# Patient Record
Sex: FEMALE | Race: ASIAN | Employment: FULL TIME | URBAN - METROPOLITAN AREA
[De-identification: names, ages, dates, MRNs, and addresses within clinical notes are randomized per-mention and may not be internally consistent; named-entity substitution may affect disease eponyms.]

---

## 2017-01-09 ENCOUNTER — OFFICE VISIT (OUTPATIENT)
Dept: FAMILY MEDICINE CLINIC | Age: 37
End: 2017-01-09

## 2017-01-09 VITALS
HEART RATE: 88 BPM | SYSTOLIC BLOOD PRESSURE: 104 MMHG | WEIGHT: 152 LBS | HEIGHT: 59 IN | DIASTOLIC BLOOD PRESSURE: 78 MMHG | RESPIRATION RATE: 16 BRPM | OXYGEN SATURATION: 99 % | TEMPERATURE: 97.3 F | BODY MASS INDEX: 30.64 KG/M2

## 2017-01-09 DIAGNOSIS — R73.03 PREDIABETES: ICD-10-CM

## 2017-01-09 DIAGNOSIS — E55.9 VITAMIN D DEFICIENCY: ICD-10-CM

## 2017-01-09 DIAGNOSIS — D56.3 THALASSEMIA MINOR: Primary | ICD-10-CM

## 2017-01-09 DIAGNOSIS — E03.9 HYPOTHYROIDISM IN ADULT: Primary | ICD-10-CM

## 2017-01-09 RX ORDER — LANOLIN ALCOHOL/MO/W.PET/CERES
CREAM (GRAM) TOPICAL
COMMUNITY
End: 2017-01-10 | Stop reason: DRUGHIGH

## 2017-01-09 NOTE — PROGRESS NOTES
HISTORY OF PRESENT ILLNESS  Jennifer Beatty is a 31 y.o. female. She was seen for follow up on thyroid, prediabetes and Vitamin D deficiency. She is breast feeding  HPI  Endocrine Review  Patient is seen for followup of hypothyroidism. Since last visit: her Levothyroxine dose was adjusted to 50 mcg as pt was pregnant. She reports medication compliance: all the time and is taking separate from all her other meds. She reports the following concerns/problems/med side effects: none. Lab review: no lab studies available for review at time of visit. Last blood work with Ob/Gyn  Lab Results   Component Value Date/Time    TSH 3.820 09/10/2015 12:00 AM    T4, Free 1.35 09/10/2015 12:00 AM         Endocrine Review  She is seen for gestational diabetes. Since last visit she reports: no chest pain, dyspnea or TIA's, no numbness, tingling or pain in extremities, no unusual visual symptoms, no hypoglycemia, weight has decreased. Home glucose monitoring: is not performed  She reports medication compliance: n/a - patient not on medications (diet controlled). Medication side effects: n/a. Diabetic diet compliance: compliant all of the time. Lab review: no lab studies available for review at time of visit. Eye exam: n/a  She was on Insulin for last two months of pregnancy  Lab Results   Component Value Date/Time    Hemoglobin A1c 6.1 09/10/2015 12:00 AM      Lab Results   Component Value Date/Time    VITAMIN D, 25-HYDROXY 41.0 09/10/2015 12:00 AM           Review of Systems   Constitutional: Negative for chills, fever and malaise/fatigue. HENT: Negative for congestion, ear pain, sore throat and tinnitus. Eyes: Negative for blurred vision, double vision, pain and discharge. Respiratory: Negative for cough, shortness of breath and wheezing. Cardiovascular: Negative for chest pain, palpitations and leg swelling.    Gastrointestinal: Negative for abdominal pain, blood in stool, constipation, diarrhea, nausea and vomiting. Genitourinary: Negative for dysuria, frequency, hematuria and urgency. Musculoskeletal: Negative for back pain, joint pain and myalgias. Skin: Negative for rash. Neurological: Negative for dizziness, tremors, seizures and headaches. Endo/Heme/Allergies: Negative for polydipsia. Does not bruise/bleed easily. Psychiatric/Behavioral: Negative for depression and substance abuse. The patient is not nervous/anxious. Physical Exam   Constitutional: She is oriented to person, place, and time. She appears well-developed and well-nourished. HENT:   Head: Normocephalic and atraumatic. Right Ear: External ear normal.   Mouth/Throat: Oropharynx is clear and moist. No oropharyngeal exudate. Eyes: Conjunctivae and EOM are normal. Pupils are equal, round, and reactive to light. No scleral icterus. Neck: Normal range of motion. Neck supple. No JVD present. No thyromegaly present. Cardiovascular: Normal rate, regular rhythm, normal heart sounds and intact distal pulses. No murmur heard. Pulmonary/Chest: Effort normal and breath sounds normal. She has no wheezes. Abdominal: Soft. Bowel sounds are normal. She exhibits no distension and no mass. Musculoskeletal: Normal range of motion. She exhibits no edema or tenderness. Lymphadenopathy:     She has no cervical adenopathy. Neurological: She is alert and oriented to person, place, and time. She has normal reflexes. No cranial nerve deficit. Skin: Skin is warm and dry. No rash noted. She is not diaphoretic. Psychiatric: She has a normal mood and affect. Nursing note and vitals reviewed. ASSESSMENT and PLAN  Jennifer was seen today for thyroid problem.     Diagnoses and all orders for this visit:    Hypothyroidism in adult  -     [place] HANDLG&/OR CONVEY OF SPEC FOR TR OFFICE TO LAB  -     [place] COLLECTION VENOUS BLOOD,VENIPUNCTURE  -     METABOLIC PANEL, COMPREHENSIVE  -     TSH AND FREE T4    Vitamin D deficiency  -     [place] HANDLG&/OR CONVEY OF SPEC FOR TR OFFICE TO LAB  -     [place] COLLECTION VENOUS BLOOD,VENIPUNCTURE  -     VITAMIN D, 25 HYDROXY    Prediabetes  -     MD HANDLG&/OR CONVEY OF SPEC FOR TR OFFICE TO LAB  -     MD COLLECTION VENOUS BLOOD,VENIPUNCTURE  -     HEMOGLOBIN A1C WITH EAG    Discussed lifestyle issues and health guidance given  Patient was given an after visit summary which includes diagnoses, vital signs, current medications, instructions and references & authorized prescriptions . Results of labs will be conveyed to patient, once available. Pt verbalized instructions I provided and expressed understanding of discussion that was held today.   Follow-up Disposition:  Return in about 6 months (around 7/9/2017) for fasting, physical.

## 2017-01-09 NOTE — MR AVS SNAPSHOT
Visit Information Date & Time Provider Department Dept. Phone Encounter #  
 1/9/2017  1:15 PM Wilian Davila MD [facility] [id number] Follow-up Instructions Return in about 6 months (around 7/9/2017) for fasting, physical.  
  
Upcoming Health Maintenance Date Due  
 PAP AKA CERVICAL CYTOLOGY 3/7/2019 DTaP/Tdap/Td series (2 - Td) 8/8/2026 Allergies as of 1/9/2017  Review Complete On: 1/9/2017 By: Wilian Davila MD  
 No Known Allergies Current Immunizations  Reviewed on 1/9/2017 Name Date Tdap 8/8/2016 Reviewed by Wilian Davila MD on 1/9/2017 at  1:35 PM  
You Were Diagnosed With   
  
 Codes Comments Hypothyroidism in adult    -  Primary ICD-10-CM: E03.9 ICD-9-CM: 480. 9 Vitamin D deficiency     ICD-10-CM: E55.9 ICD-9-CM: 268.9 Prediabetes     ICD-10-CM: R73.03 
ICD-9-CM: 790.29 Vitals BP Pulse Temp Resp Height(growth percentile) 104/78 (BP 1 Location: Left arm, BP Patient Position: Sitting) 88 97.3 °F (36.3 °C) (Axillary) 16 4' 11\" (1.499 m) Weight(growth percentile) SpO2 BMI OB Status Smoking Status 152 lb (68.9 kg) 99% 30.7 kg/m2 Recent pregnancy Never Smoker Vitals History BMI and BSA Data Body Mass Index Body Surface Area 30.7 kg/m 2 1.69 m 2 Preferred Pharmacy Pharmacy Name Phone [facility] [address] [place] [address] [phone] Your Updated Medication List  
  
   
This list is accurate as of: 1/9/17  1:44 PM.  Always use your most recent med list.  
  
  
  
  
 ergocalciferol 50,000 unit capsule Commonly known as:  ERGOCALCIFEROL Take 1 Cap by mouth every seven (7) days. ferrous gluconate 324 mg (36 mg iron) Tab Take 1 Tab by mouth daily. levothyroxine 50 mcg tablet Commonly known as:  SYNTHROID Take 1 Tab by mouth Daily (before breakfast). PRENATAL #2 PO Take  by mouth. We Performed the Following HEMOGLOBIN A1C WITH EAG [08203 CPT(R)] METABOLIC PANEL, COMPREHENSIVE [10774 CPT(R)] [place] COLLECTION VENOUS BLOOD,VENIPUNCTURE [id number] CPT(R)] [place] HANDLG&/OR CONVEY OF SPEC FOR TR OFFICE TO LAB [76311 CPT(R)] TSH AND FREE T4 [75373 CPT(R)] VITAMIN D, 25 HYDROXY [id number] CPT(R)] Follow-up Instructions Return in about 6 months (around 7/9/2017) for fasting, physical.  
  
  
Introducing [facility]! Dear Esau Handley: Thank you for requesting a SMRxT account. Our records indicate that you already have an active SMRxT account. You can access your account anytime at [email or web address]. [email or web address]/[employer] Did you know that you can access your hospital and ER discharge instructions at any time in SMRxT? You can also review all of your test results from your hospital stay or ER visit. Additional Information If you have questions, please visit the Frequently Asked Questions section of the SMRxT website at [email or web address] [email or web address]/. Remember, SMRxT is NOT to be used for urgent needs. For medical emergencies, dial 911. Now available from your iPhone and Android! Please provide this summary of care documentation to your next provider. Your primary care clinician is listed as Lana Portillo. If you have any questions after today's visit, please call [phone].

## 2017-01-09 NOTE — PROGRESS NOTES
Chief Complaint   Patient presents with    Thyroid Problem     Follow up. Gestational diabetes. 1. Have you been to the ER, urgent care clinic since your last visit? Hospitalized since your last visit? Yes, delivery of babyt 10/27/16. 2. Have you seen or consulted any other health care providers outside of the [facility] since your last visit? Yes. OB/GYN. Include any pap smears or colon screening.   Yes

## 2017-01-10 LAB
25(OH)D3+25(OH)D2 SERPL-MCNC: 23.6 NG/ML (ref 30–100)
ALBUMIN SERPL-MCNC: 4.3 G/DL (ref 3.5–5.5)
ALBUMIN/GLOB SERPL: 1.4 {RATIO} (ref 1.1–2.5)
ALP SERPL-CCNC: 54 IU/L (ref 39–117)
ALT SERPL-CCNC: 42 IU/L (ref 0–32)
AST SERPL-CCNC: 35 IU/L (ref 0–40)
BASOPHILS # BLD AUTO: 0 X10E3/UL (ref 0–0.2)
BASOPHILS NFR BLD AUTO: 0 %
BILIRUB SERPL-MCNC: <0.2 MG/DL (ref 0–1.2)
BUN SERPL-MCNC: 13 MG/DL (ref 6–20)
BUN/CREAT SERPL: 16 (ref 8–20)
CALCIUM SERPL-MCNC: 9.4 MG/DL (ref 8.7–10.2)
CHLORIDE SERPL-SCNC: 105 MMOL/L (ref 96–106)
CO2 SERPL-SCNC: 25 MMOL/L (ref 18–29)
CREAT SERPL-MCNC: 0.81 MG/DL (ref 0.57–1)
EOSINOPHIL # BLD AUTO: 0.2 X10E3/UL (ref 0–0.4)
EOSINOPHIL NFR BLD AUTO: 3 %
ERYTHROCYTE [DISTWIDTH] IN BLOOD BY AUTOMATED COUNT: 15.9 % (ref 12.3–15.4)
EST. AVERAGE GLUCOSE BLD GHB EST-MCNC: 114 MG/DL
GLOBULIN SER CALC-MCNC: 3 G/DL (ref 1.5–4.5)
GLUCOSE SERPL-MCNC: 84 MG/DL (ref 65–99)
HBA1C MFR BLD: 5.6 % (ref 4.8–5.6)
HCT VFR BLD AUTO: 31.2 % (ref 34–46.6)
HGB BLD-MCNC: 9.7 G/DL (ref 11.1–15.9)
IMM GRANULOCYTES # BLD: 0 X10E3/UL (ref 0–0.1)
IMM GRANULOCYTES NFR BLD: 0 %
LYMPHOCYTES # BLD AUTO: 2.5 X10E3/UL (ref 0.7–3.1)
LYMPHOCYTES NFR BLD AUTO: 35 %
MCH RBC QN AUTO: 19.7 PG (ref 26.6–33)
MCHC RBC AUTO-ENTMCNC: 31.1 G/DL (ref 31.5–35.7)
MCV RBC AUTO: 63 FL (ref 79–97)
MONOCYTES # BLD AUTO: 0.5 X10E3/UL (ref 0.1–0.9)
MONOCYTES NFR BLD AUTO: 6 %
NEUTROPHILS # BLD AUTO: 3.9 X10E3/UL (ref 1.4–7)
NEUTROPHILS NFR BLD AUTO: 56 %
PLATELET # BLD AUTO: 357 X10E3/UL (ref 150–379)
POTASSIUM SERPL-SCNC: 4.3 MMOL/L (ref 3.5–5.2)
PROT SERPL-MCNC: 7.3 G/DL (ref 6–8.5)
RBC # BLD AUTO: 4.92 X10E6/UL (ref 3.77–5.28)
SODIUM SERPL-SCNC: 143 MMOL/L (ref 134–144)
T4 FREE SERPL-MCNC: 2.02 NG/DL (ref 0.82–1.77)
TSH SERPL DL<=0.005 MIU/L-ACNC: 0.04 UIU/ML (ref 0.45–4.5)
WBC # BLD AUTO: 7 X10E3/UL (ref 3.4–10.8)

## 2017-01-10 RX ORDER — FERROUS SULFATE 325(65) MG
325 TABLET, DELAYED RELEASE (ENTERIC COATED) ORAL
Qty: 180 TAB | Refills: 1 | Status: SHIPPED | OUTPATIENT
Start: 2017-01-10 | End: 2019-02-06 | Stop reason: ALTCHOICE

## 2017-01-10 RX ORDER — LEVOTHYROXINE SODIUM 25 UG/1
25 TABLET ORAL
Qty: 90 TAB | Refills: 1 | Status: SHIPPED | OUTPATIENT
Start: 2017-01-10 | End: 2017-08-23 | Stop reason: SDUPTHER

## 2017-01-10 NOTE — PROGRESS NOTES
As discussed,   Thyroid profile shows, high T4 , low TSH, please decrease dose of Levothyroxine to 25 mcg daily  CBC shows significant anemia, please start taking iron Rx , I have sent to pharmacy  Kidney and liver function normal, Vitamin D borderline low, please add low dose of OTC Vitamin D3 2000 units along with prenatal vitamins

## 2017-01-11 ENCOUNTER — TELEPHONE (OUTPATIENT)
Dept: FAMILY MEDICINE CLINIC | Age: 37
End: 2017-01-11

## 2017-01-11 NOTE — TELEPHONE ENCOUNTER
Patient was left a voice message, informing of prescriptions sent to [facility] on 1/10/17: Levothyroxine and Ferrous Sulfate . Informed if she has more questions can contact via [employer] or office.

## 2017-01-11 NOTE — TELEPHONE ENCOUNTER
Confirmed with Dr. Quiana Crews, patient can take OTC ferrous sulfate 325 mg two tablets daily. Patient informed via [employer].

## 2017-01-11 NOTE — TELEPHONE ENCOUNTER
Patient was informed that per Dr. Gallo Safer she is to take the Levothyroxine 25 mcg daily. Patient stated ferrous sulfate is $79.00 and would like another recommendation, her insurance covers liquid prefers pill form. Patient was informed that the ferrous sulfate 325 mg prescribed is the same as OTC ferrous sulfate 325 mg , can purchase .

## 2017-01-11 NOTE — PROGRESS NOTES
Results discussed with patient by Dr. Carol Rogers via [employer]. Letter sent with results and instructions.

## 2017-01-20 ENCOUNTER — TELEPHONE (OUTPATIENT)
Dept: FAMILY MEDICINE CLINIC | Age: 37
End: 2017-01-20

## 2017-01-20 NOTE — TELEPHONE ENCOUNTER
Patient was left a voice message informing that a LaunchBitt message was sent to her. Also,  informed via voice message that it is confirmed by Dr. Iman Noonan that she can take OTC Ferrous Sulfate 325 mg , 2 tablets daily until levels can be checked again.

## 2017-04-04 ENCOUNTER — OFFICE VISIT (OUTPATIENT)
Dept: FAMILY MEDICINE CLINIC | Age: 37
End: 2017-04-04

## 2017-04-04 VITALS
TEMPERATURE: 97.7 F | HEART RATE: 85 BPM | HEIGHT: 59 IN | DIASTOLIC BLOOD PRESSURE: 74 MMHG | WEIGHT: 152 LBS | BODY MASS INDEX: 30.64 KG/M2 | RESPIRATION RATE: 16 BRPM | SYSTOLIC BLOOD PRESSURE: 100 MMHG | OXYGEN SATURATION: 98 %

## 2017-04-04 DIAGNOSIS — D50.8 IRON DEFICIENCY ANEMIA SECONDARY TO INADEQUATE DIETARY IRON INTAKE: ICD-10-CM

## 2017-04-04 DIAGNOSIS — E03.9 HYPOTHYROIDISM IN ADULT: Primary | ICD-10-CM

## 2017-04-04 DIAGNOSIS — E55.9 VITAMIN D DEFICIENCY: ICD-10-CM

## 2017-04-04 RX ORDER — CHOLECALCIFEROL (VITAMIN D3) 125 MCG
CAPSULE ORAL
COMMUNITY
End: 2019-02-22 | Stop reason: DRUGHIGH

## 2017-04-04 NOTE — PATIENT INSTRUCTIONS
Iron-Rich Diet: Care Instructions  Your Care Instructions  Your body needs iron to make hemoglobin. Hemoglobin is a substance in red blood cells that carries oxygen from the lungs to cells all through your body. If you do not get enough iron, your body makes fewer and smaller red blood cells. As a result, your body's cells may not get enough oxygen. Adult men need 8 milligrams of iron a day; adult women need 18 milligrams of iron a day. After menopause, women need 8 milligrams of iron a day. A pregnant woman needs 27 milligrams of iron a day. Infants and young children have higher iron needs relative to their size than other age groups. People who have lost blood because of ulcers or heavy menstrual periods may become very low in iron and may develop anemia. Most people can get the iron their bodies need by eating enough of certain iron-rich foods. Your doctor may recommend that you take an iron supplement along with eating an iron-rich diet. Follow-up care is a key part of your treatment and safety. Be sure to make and go to all appointments, and call your doctor if you are having problems. Its also a good idea to know your test results and keep a list of the medicines you take. How can you care for yourself at home? · Make iron-rich foods a part of your daily diet. Iron-rich foods include:  ¨ All meats, such as chicken, beef, lamb, pork, fish, and shellfish. Liver is especially high in iron. ¨ Leafy green vegetables. ¨ Raisins, peas, beans, lentils, barley, and eggs. ¨ Iron-fortified breakfast cereals. · Eat foods with vitamin C along with iron-rich foods. Vitamin C helps you absorb more iron from food. Drink a glass of orange juice or another citrus juice with your food. · Eat meat and vegetables or grains together. The iron in meat helps your body absorb the iron in other foods. Where can you learn more? Go to http://solitario-star.info/.   Enter [id number] in the search box to learn more about \"Iron-Rich Diet: Care Instructions. \"  Current as of: July 26, 2016  Content Version: 11.2  © 5784-3599 [employer]. Care instructions adapted under license by [employer] (which disclaims liability or warranty for this information). If you have questions about a medical condition or this instruction, always ask your healthcare professional. [address] any warranty or liability for your use of this information.

## 2017-04-04 NOTE — PROGRESS NOTES
HISTORY OF PRESENT ILLNESS  Jennifer Larry is a 30 y.o. female. She was seen for 3 months follow up on hypothyroid, anemia, fatigue and dizziness. She is 5 months postpartum and is breast feeding. HPI  Endocrine Review  Patient is seen for followup of hypothyroidism. Since last visit: synthroid dose was decreased  She reports medication compliance: all the time and is taking separate from all her other meds. She reports the following concerns/problems/med side effects: none. Lab review: labs reviewed and discussed with patient. On Synthroid 25 mcg daily  Lab Results   Component Value Date/Time    TSH 0.039 01/09/2017 02:13 PM    T4, Free 2.02 01/09/2017 02:13 PM     Anemia  Patient presents for presents evaluation of anemia. Anemia was found by chronic. It has been present for several years. Associated signs & symptoms: fatigue, dizziness/lightheadedness. Lab Results   Component Value Date/Time    WBC 7.0 01/09/2017 02:13 PM    HGB 9.7 01/09/2017 02:13 PM    HCT 31.2 01/09/2017 02:13 PM    PLATELET 906 29/13/4089 02:13 PM    MCV 63 01/09/2017 02:13 PM         Review of Systems   Constitutional: Positive for malaise/fatigue. Negative for chills and fever. HENT: Negative for congestion, ear pain, sore throat and tinnitus. Eyes: Negative for blurred vision, double vision, pain and discharge. Respiratory: Negative for cough, shortness of breath and wheezing. Cardiovascular: Negative for chest pain, palpitations and leg swelling. Gastrointestinal: Negative for abdominal pain, blood in stool, constipation, diarrhea, nausea and vomiting. Genitourinary: Negative for dysuria, frequency, hematuria and urgency. Musculoskeletal: Negative for back pain, joint pain and myalgias. Skin: Negative for rash. Neurological: Positive for dizziness. Negative for tremors, seizures and headaches. Endo/Heme/Allergies: Negative for polydipsia. Does not bruise/bleed easily.    Psychiatric/Behavioral: Negative for depression and substance abuse. The patient is not nervous/anxious. Physical Exam   Constitutional: She is oriented to person, place, and time. She appears well-developed and well-nourished. HENT:   Head: Normocephalic and atraumatic. Right Ear: External ear normal.   Mouth/Throat: Oropharynx is clear and moist. No oropharyngeal exudate. Eyes: Conjunctivae and EOM are normal. Pupils are equal, round, and reactive to light. No scleral icterus. Neck: Normal range of motion. Neck supple. No JVD present. No thyromegaly present. Cardiovascular: Normal rate, regular rhythm, normal heart sounds and intact distal pulses. No murmur heard. Pulmonary/Chest: Effort normal and breath sounds normal. She has no wheezes. Abdominal: Soft. Bowel sounds are normal. She exhibits no distension and no mass. Musculoskeletal: Normal range of motion. She exhibits no edema or tenderness. Lymphadenopathy:     She has no cervical adenopathy. Neurological: She is alert and oriented to person, place, and time. She has normal reflexes. No cranial nerve deficit. Skin: Skin is warm and dry. No rash noted. She is not diaphoretic. Psychiatric: She has a normal mood and affect. Nursing note and vitals reviewed. ASSESSMENT and PLAN  Jennifer was seen today for thyroid problem. Diagnoses and all orders for this visit:    Hypothyroidism in adult  -     TSH AND FREE T4    Iron deficiency anemia secondary to inadequate dietary iron intake  -     CBC WITH AUTOMATED DIFF    Vitamin D deficiency  -     VITAMIN D, 25 HYDROXY    Discussed lifestyle issues and health guidance given  Patient was given an after visit summary which includes diagnoses, vital signs, current medications, instructions and references & authorized prescriptions . Results of labs will be conveyed to patient, once available. Pt verbalized instructions I provided and expressed understanding of discussion that was held today.   Follow-up Disposition:  Return in about 4 months (around 8/4/2017) for fasting, physical.

## 2017-04-04 NOTE — MR AVS SNAPSHOT
Visit Information Date & Time Provider Department Dept. Phone Encounter #  
 4/4/2017  3:40 PM Maribel Ybarra, [address] [facility] [phone] [id number] Follow-up Instructions Return in about 4 months (around 8/4/2017) for fasting, physical.  
  
Upcoming Health Maintenance Date Due  
 PAP AKA CERVICAL CYTOLOGY 3/7/2019 DTaP/Tdap/Td series (2 - Td) 8/8/2026 Allergies as of 4/4/2017  Review Complete On: 4/4/2017 By: Maribel Ybarra MD  
 No Known Allergies Current Immunizations  Reviewed on 1/9/2017 Name Date Tdap 8/8/2016 Not reviewed this visit You Were Diagnosed With   
  
 Codes Comments Hypothyroidism in adult    -  Primary ICD-10-CM: E03.9 ICD-9-CM: 244.9 Iron deficiency anemia secondary to inadequate dietary iron intake     ICD-10-CM: D50.8 ICD-9-CM: 280.1 Vitamin D deficiency     ICD-10-CM: E55.9 ICD-9-CM: 268.9 Vitals BP Pulse Temp Resp Height(growth percentile) Weight(growth percentile) 100/74 85 97.7 °F (36.5 °C) (Oral) 16 4' 11\" (1.499 m) 152 lb (68.9 kg) SpO2 BMI OB Status Smoking Status 98% 30.7 kg/m2 Recent pregnancy Never Smoker Vitals History BMI and BSA Data Body Mass Index Body Surface Area 30.7 kg/m 2 1.69 m 2 Preferred Pharmacy Pharmacy Name Phone [facility] [phone] [facility] [phone] Your Updated Medication List  
  
   
This list is accurate as of: 4/4/17  4:31 PM.  Always use your most recent med list.  
  
  
  
  
 ergocalciferol 50,000 unit capsule Commonly known as:  ERGOCALCIFEROL Take 1 Cap by mouth every seven (7) days. ferrous sulfate 325 mg (65 mg iron) EC tablet Commonly known as:  IRON Take 1 Tab by mouth Before breakfast and dinner. levothyroxine 25 mcg tablet Commonly known as:  SYNTHROID Take 1 Tab by mouth Daily (before breakfast).   
  
 PRENATAL #2 PO  
 Take  by mouth. VITAMIN D3 2,000 unit Tab Generic drug:  cholecalciferol (vitamin D3) Take  by mouth. Takes 1 tab daily. We Performed the Following CBC WITH AUTOMATED DIFF [56222 CPT(R)] TSH AND FREE T4 [65008 CPT(R)] VITAMIN D, 25 HYDROXY [id number] CPT(R)] Follow-up Instructions Return in about 4 months (around 8/4/2017) for fasting, physical.  
  
  
Patient Instructions Iron-Rich Diet: Care Instructions Your Care Instructions Your body needs iron to make hemoglobin. Hemoglobin is a substance in red blood cells that carries oxygen from the lungs to cells all through your body. If you do not get enough iron, your body makes fewer and smaller red blood cells. As a result, your body's cells may not get enough oxygen. Adult men need 8 milligrams of iron a day; adult women need 18 milligrams of iron a day. After menopause, women need 8 milligrams of iron a day. A pregnant woman needs 27 milligrams of iron a day. Infants and young children have higher iron needs relative to their size than other age groups. People who have lost blood because of ulcers or heavy menstrual periods may become very low in iron and may develop anemia. Most people can get the iron their bodies need by eating enough of certain iron-rich foods. Your doctor may recommend that you take an iron supplement along with eating an iron-rich diet. Follow-up care is a key part of your treatment and safety. Be sure to make and go to all appointments, and call your doctor if you are having problems. Its also a good idea to know your test results and keep a list of the medicines you take. How can you care for yourself at home? · Make iron-rich foods a part of your daily diet. Iron-rich foods include: ¨ All meats, such as chicken, beef, lamb, pork, fish, and shellfish. Liver is especially high in iron. ¨ Leafy green vegetables. ¨ Raisins, peas, beans, lentils, barley, and eggs. ¨ Iron-fortified breakfast cereals. · Eat foods with vitamin C along with iron-rich foods. Vitamin C helps you absorb more iron from food. Drink a glass of orange juice or another citrus juice with your food. · Eat meat and vegetables or grains together. The iron in meat helps your body absorb the iron in other foods. Where can you learn more? Go to http://solitario-star.info/. Enter [id number] in the search box to learn more about \"Iron-Rich Diet: Care Instructions. \" Current as of: July 26, 2016 Content Version: 11.2 © 8523-0841 [employer]. Care instructions adapted under license by [employer] (which disclaims liability or warranty for this information). If you have questions about a medical condition or this instruction, always ask your healthcare professional. [address] any warranty or liability for your use of this information. Introducing [facility]! Dear Rio Menon: Thank you for requesting a Synchro account. Our records indicate that you already have an active Synchro account. You can access your account anytime at [email or web address] Did you know that you can access your hospital and ER discharge instructions at any time in Synchro? You can also review all of your test results from your hospital stay or ER visit. Additional Information If you have questions, please visit the Frequently Asked Questions section of the Synchro website at [email or web address] Remember, Synchro is NOT to be used for urgent needs. For medical emergencies, dial 911. Now available from your iPhone and Android! Please provide this summary of care documentation to your next provider. Your primary care clinician is listed as Pee Erickson. If you have any questions after today's visit, please call [phone].

## 2017-04-04 NOTE — PROGRESS NOTES
Pt presents to office today for a follow up on Thyroid Problem and lab work. Pt states that she has very fatigued lately and will like to discuss this. Chief Complaint   Patient presents with    Thyroid Problem     Follow up. 1. Have you been to the ER, urgent care clinic since your last visit? Hospitalized since your last visit? No    2. Have you seen or consulted any other health care providers outside of the [address] [place] [address] since your last visit? Include any pap smears or colon screening.  No

## 2017-04-05 LAB
25(OH)D3+25(OH)D2 SERPL-MCNC: 26.7 NG/ML (ref 30–100)
BASOPHILS # BLD AUTO: 0 X10E3/UL (ref 0–0.2)
BASOPHILS NFR BLD AUTO: 0 %
EOSINOPHIL # BLD AUTO: 0.2 X10E3/UL (ref 0–0.4)
EOSINOPHIL NFR BLD AUTO: 3 %
ERYTHROCYTE [DISTWIDTH] IN BLOOD BY AUTOMATED COUNT: 17.5 % (ref 12.3–15.4)
HCT VFR BLD AUTO: 33 % (ref 34–46.6)
HGB BLD-MCNC: 10.1 G/DL (ref 11.1–15.9)
IMM GRANULOCYTES # BLD: 0 X10E3/UL (ref 0–0.1)
IMM GRANULOCYTES NFR BLD: 0 %
LYMPHOCYTES # BLD AUTO: 3.3 X10E3/UL (ref 0.7–3.1)
LYMPHOCYTES NFR BLD AUTO: 44 %
MCH RBC QN AUTO: 19.6 PG (ref 26.6–33)
MCHC RBC AUTO-ENTMCNC: 30.6 G/DL (ref 31.5–35.7)
MCV RBC AUTO: 64 FL (ref 79–97)
MONOCYTES # BLD AUTO: 0.4 X10E3/UL (ref 0.1–0.9)
MONOCYTES NFR BLD AUTO: 5 %
NEUTROPHILS # BLD AUTO: 3.7 X10E3/UL (ref 1.4–7)
NEUTROPHILS NFR BLD AUTO: 48 %
PLATELET # BLD AUTO: 350 X10E3/UL (ref 150–379)
RBC # BLD AUTO: 5.15 X10E6/UL (ref 3.77–5.28)
T4 FREE SERPL-MCNC: 1 NG/DL (ref 0.82–1.77)
TSH SERPL DL<=0.005 MIU/L-ACNC: 16.12 UIU/ML (ref 0.45–4.5)
WBC # BLD AUTO: 7.6 X10E3/UL (ref 3.4–10.8)

## 2017-04-05 NOTE — PROGRESS NOTES
Kyle Rodriguez,  i have reviewed your results. Hgb has improved but still low, as you have thalassemia minor. Just ct with iron pill. Thyroid profile now shows your TSH is up and T4 normal, will have to increase dose.  Take 2 tablets on Sat and Sunday  Vitamin D borderline low, please ct with OTC vitamin D3 2000 units

## 2017-09-01 RX ORDER — LEVOTHYROXINE SODIUM 25 UG/1
TABLET ORAL
Qty: 90 TAB | Refills: 0 | Status: SHIPPED | COMMUNITY
Start: 2017-09-01 | End: 2017-11-08 | Stop reason: SDUPTHER

## 2017-10-16 ENCOUNTER — OFFICE VISIT (OUTPATIENT)
Dept: FAMILY MEDICINE CLINIC | Age: 37
End: 2017-10-16

## 2017-10-16 VITALS
OXYGEN SATURATION: 97 % | SYSTOLIC BLOOD PRESSURE: 102 MMHG | HEIGHT: 59 IN | BODY MASS INDEX: 30.64 KG/M2 | TEMPERATURE: 98.1 F | HEART RATE: 90 BPM | DIASTOLIC BLOOD PRESSURE: 70 MMHG | WEIGHT: 152 LBS

## 2017-10-16 DIAGNOSIS — R73.03 PREDIABETES: ICD-10-CM

## 2017-10-16 DIAGNOSIS — E03.9 HYPOTHYROIDISM IN ADULT: Primary | ICD-10-CM

## 2017-10-16 DIAGNOSIS — E55.9 VITAMIN D DEFICIENCY: ICD-10-CM

## 2017-10-16 DIAGNOSIS — D50.9 MICROCYTIC HYPOCHROMIC ANEMIA: ICD-10-CM

## 2017-10-16 NOTE — PROGRESS NOTES
HISTORY OF PRESENT ILLNESS  Jennifer Trammell is a 30 y.o. female. she was seen for follow up on hypothyroid, anemia , vitamin d deficiency and fatigue  She is 1 year post partum and is still breast feeding  HPI  Endocrine Review  Patient is seen for followup of hypothyroidism and prediabetes. Since last visit: synthroid dose was decreased  She reports medication compliance: all the time and is taking separate from all her other meds. She reports the following concerns/problems/med side effects: none. Lab review: labs reviewed and discussed with patient. On Synthroid 25 mcg 5 days a week and 50 mcg on sat/Sun    Lab Results   Component Value Date/Time    TSH 16.120 04/04/2017 04:36 PM    T4, Free 1.00 04/04/2017 04:36 PM     Lab Results   Component Value Date/Time    Hemoglobin A1c 5.6 01/09/2017 02:13 PM        Anemia  Patient presents for presents evaluation of anemia. Anemia was found by routine blood work. It has been present for several years. Associated signs & symptoms: fatigue, dizziness/lightheadedness. She has been diagnosed with thalassemia minor in past  Lab Results   Component Value Date/Time    WBC 7.6 04/04/2017 04:36 PM    HGB 10.1 04/04/2017 04:36 PM    HCT 33.0 04/04/2017 04:36 PM    PLATELET 832 34/91/1291 04:36 PM    MCV 64 04/04/2017 04:36 PM       Review of Systems   Constitutional: Positive for malaise/fatigue. Negative for chills and fever. HENT: Negative for congestion, ear pain, sore throat and tinnitus. Eyes: Negative for blurred vision, double vision, pain and discharge. Respiratory: Negative for cough, shortness of breath and wheezing. Cardiovascular: Negative for chest pain, palpitations and leg swelling. Gastrointestinal: Negative for abdominal pain, blood in stool, constipation, diarrhea, nausea and vomiting. Genitourinary: Negative for dysuria, frequency, hematuria and urgency. Musculoskeletal: Positive for back pain. Negative for joint pain and myalgias.    Skin: Negative for rash. Neurological: Positive for dizziness. Negative for tremors, seizures and headaches. Endo/Heme/Allergies: Negative for polydipsia. Does not bruise/bleed easily. Psychiatric/Behavioral: Negative for depression and substance abuse. The patient is not nervous/anxious. Physical Exam   Constitutional: She is oriented to person, place, and time. She appears well-developed and well-nourished. HENT:   Head: Normocephalic and atraumatic. Right Ear: External ear normal.   Mouth/Throat: Oropharynx is clear and moist. No oropharyngeal exudate. Eyes: Conjunctivae and EOM are normal. Pupils are equal, round, and reactive to light. No scleral icterus. Neck: Normal range of motion. Neck supple. No JVD present. No thyromegaly present. Cardiovascular: Normal rate, regular rhythm, normal heart sounds and intact distal pulses. No murmur heard. Pulmonary/Chest: Effort normal and breath sounds normal. She has no wheezes. Abdominal: Soft. Bowel sounds are normal. She exhibits no distension and no mass. Musculoskeletal: Normal range of motion. She exhibits no edema or tenderness. Lymphadenopathy:     She has no cervical adenopathy. Neurological: She is alert and oriented to person, place, and time. She has normal reflexes. No cranial nerve deficit. Skin: Skin is warm and dry. No rash noted. She is not diaphoretic. Psychiatric: She has a normal mood and affect. Nursing note and vitals reviewed. ASSESSMENT and PLAN  Diagnoses and all orders for this visit:    1. Hypothyroidism in adult  -     METABOLIC PANEL, COMPREHENSIVE  -     TSH AND FREE T4    2. Vitamin D deficiency  -     VITAMIN D, 25 HYDROXY    3. Prediabetes  -     HEMOGLOBIN A1C WITH EAG    4.  Microcytic hypochromic anemia  -     IRON PROFILE  -     CBC WITH AUTOMATED DIFF    Discussed lifestyle issues and health guidance given  Patient was given an after visit summary which includes diagnoses, vital signs, current medications, instructions and references & authorized prescriptions . Results of labs will be conveyed to patient, once available. Pt verbalized instructions I provided and expressed understanding of discussion that was held today. Follow-up Disposition:  Return in about 6 months (around 4/16/2018) for follow up, Thyroid.

## 2017-10-16 NOTE — PATIENT INSTRUCTIONS
Hypothyroidism: Care Instructions  Your Care Instructions  You have hypothyroidism, which means that your body is not making enough thyroid hormone. This hormone helps your body use energy. If your thyroid level is low, you may feel tired, be constipated, have an increase in your blood pressure, or have dry skin or memory problems. You may also get cold easily, even when it is warm. Women with low thyroid levels may have heavy menstrual periods. A blood test to find your thyroid-stimulating hormone (TSH) level is used to check for hypothyroidism. A high TSH level may mean that you have low thyroid. When your body is not making enough thyroid hormone, TSH levels rise in an effort to make the body produce more. The treatment for hypothyroidism is to take thyroid hormone pills. You should start to feel better in 1 to 2 weeks. But it can take several months to see changes in the TSH level. You will need regular visits with your doctor to make sure you have the right dose of medicine. Most people need treatment for the rest of their lives. You will need to see your doctor regularly to have blood tests and to make sure you are doing well. Follow-up care is a key part of your treatment and safety. Be sure to make and go to all appointments, and call your doctor if you are having problems. Its also a good idea to know your test results and keep a list of the medicines you take. How can you care for yourself at home? · Take your thyroid hormone medicine exactly as prescribed. Call your doctor if you think you are having a problem with your medicine. Most people do not have side effects if they take the right amount of medicine regularly. ¨ Take the medicine 30 minutes before breakfast, and do not take it with calcium, vitamins, or iron. ¨ Do not take extra doses of your thyroid medicine. It will not help you get better any faster, and it may cause side effects.   ¨ If you forget to take a dose, do NOT take a double dose of medicine. Take your usual dose the next day. · Tell your doctor about all prescription, herbal, or over-the-counter products you take. · Take care of yourself. Eat a healthy diet, get enough sleep, and get regular exercise. When should you call for help? Call 911 anytime you think you may need emergency care. For example, call if:  · You passed out (lost consciousness). · You have severe trouble breathing. · You have a very slow heartbeat (less than 60 beats a minute). · You have a low body temperature (95°F or below). Call your doctor now or seek immediate medical care if:  · You feel tired, sluggish, or weak. · You have trouble remembering things or concentrating. · You do not begin to feel better 2 weeks after starting your medicine. Watch closely for changes in your health, and be sure to contact your doctor if you have any problems. Where can you learn more? Go to http://solitario-star.info/. Enter C082 in the search box to learn more about \"Hypothyroidism: Care Instructions. \"  Current as of: July 28, 2016  Content Version: 11.3  © 5093-0276 [employer]. Care instructions adapted under license by [employer] (which disclaims liability or warranty for this information). If you have questions about a medical condition or this instruction, always ask your healthcare professional. [place] [address] any warranty or liability for your use of this information. Back Stretches: Exercises  Your Care Instructions  Here are some examples of exercises for stretching your back. Start each exercise slowly. Ease off the exercise if you start to have pain. Your doctor or physical therapist will tell you when you can start these exercises and which ones will work best for you. How to do the exercises  Overhead stretch    1. Stand comfortably with your feet shoulder-width apart.   2. Looking straight ahead, raise both arms over your head and reach toward the ceiling. Do not allow your head to tilt back. 3. Hold for 15 to 30 seconds, then lower your arms to your sides. 4. Repeat 2 to 4 times. Side stretch    1. Stand comfortably with your feet shoulder-width apart. 2. Raise one arm over your head, and then lean to the other side. 3. Slide your hand down your leg as you let the weight of your arm gently stretch your side muscles. Hold for 15 to 30 seconds. 4. Repeat 2 to 4 times on each side. Press-up    1. Lie on your stomach, supporting your body with your forearms. 2. Press your elbows down into the floor to raise your upper back. As you do this, relax your stomach muscles and allow your back to arch without using your back muscles. As your press up, do not let your hips or pelvis come off the floor. 3. Hold for 15 to 30 seconds, then relax. 4. Repeat 2 to 4 times. Relax and rest    1. Lie on your back with a rolled towel under your neck and a pillow under your knees. Extend your arms comfortably to your sides. 2. Relax and breathe normally. 3. Remain in this position for about 10 minutes. 4. If you can, do this 2 or 3 times each day. Follow-up care is a key part of your treatment and safety. Be sure to make and go to all appointments, and call your doctor if you are having problems. It's also a good idea to know your test results and keep a list of the medicines you take. Where can you learn more? Go to http://solitario-star.info/. Enter I285 in the search box to learn more about \"Back Stretches: Exercises. \"  Current as of: March 21, 2017  Content Version: 11.3  © 9000-2964 [employer]. Care instructions adapted under license by [employer] (which disclaims liability or warranty for this information).  If you have questions about a medical condition or this instruction, always ask your healthcare professional. [address] any warranty or liability for your use of this information.

## 2017-10-16 NOTE — MR AVS SNAPSHOT
Visit Information Date & Time Provider Department Dept. Phone Encounter #  
 10/16/2017  4:00 PM Kelsi Mattson, [address] [phone] [id number] Follow-up Instructions Return in about 6 months (around 4/16/2018) for follow up, Thyroid. Upcoming Health Maintenance Date Due  
 PAP AKA CERVICAL CYTOLOGY 3/7/2019 DTaP/Tdap/Td series (2 - Td) 8/8/2026 Allergies as of 10/16/2017  Review Complete On: 10/16/2017 By: Kelsi Mattson MD  
 No Known Allergies Current Immunizations  Reviewed on 1/9/2017 Name Date Tdap 8/8/2016 Not reviewed this visit You Were Diagnosed With   
  
 Codes Comments Hypothyroidism in adult    -  Primary ICD-10-CM: E03.9 ICD-9-CM: 105. 9 Vitamin D deficiency     ICD-10-CM: E55.9 ICD-9-CM: 268.9 Prediabetes     ICD-10-CM: R73.03 
ICD-9-CM: 790.29 Microcytic hypochromic anemia     ICD-10-CM: D50.9 ICD-9-CM: 280.9 Vitals BP Pulse Temp Height(growth percentile) Weight(growth percentile) 102/70 (BP 1 Location: Left arm, BP Patient Position: Sitting) 90 98.1 °F (36.7 °C) (Axillary) 4' 11\" (1.499 m) 152 lb (68.9 kg) SpO2 BMI OB Status Smoking Status 97% 30.7 kg/m2 Breastfeeding Never Smoker Vitals History BMI and BSA Data Body Mass Index Body Surface Area 30.7 kg/m 2 1.69 m 2 Preferred Pharmacy Pharmacy Name Phone [facility] [address] [place] [address] [phone] Your Updated Medication List  
  
   
This list is accurate as of: 10/16/17  4:57 PM.  Always use your most recent med list.  
  
  
  
  
 ferrous sulfate 325 mg (65 mg iron) EC tablet Commonly known as:  IRON Take 1 Tab by mouth Before breakfast and dinner. levothyroxine 25 mcg tablet Commonly known as:  SYNTHROID  
TAKE ONE TABLET BY MOUTH ONCE DAILY BEFORE BREAKFAST PRENATAL #2 PO Take  by mouth. VITAMIN D3 2,000 unit Tab Generic drug:  cholecalciferol (vitamin D3) Take  by mouth. Takes 1 tab daily. We Performed the Following CBC WITH AUTOMATED DIFF [57761 CPT(R)] HEMOGLOBIN A1C WITH EAG [75399 CPT(R)] IRON PROFILE [id number] CPT(R)] METABOLIC PANEL, COMPREHENSIVE [23151 CPT(R)] TSH AND FREE T4 [68669 CPT(R)] VITAMIN D, 25 HYDROXY [id number] CPT(R)] Follow-up Instructions Return in about 6 months (around 4/16/2018) for follow up, Thyroid. Patient Instructions Hypothyroidism: Care Instructions Your Care Instructions You have hypothyroidism, which means that your body is not making enough thyroid hormone. This hormone helps your body use energy. If your thyroid level is low, you may feel tired, be constipated, have an increase in your blood pressure, or have dry skin or memory problems. You may also get cold easily, even when it is warm. Women with low thyroid levels may have heavy menstrual periods. A blood test to find your thyroid-stimulating hormone (TSH) level is used to check for hypothyroidism. A high TSH level may mean that you have low thyroid. When your body is not making enough thyroid hormone, TSH levels rise in an effort to make the body produce more. The treatment for hypothyroidism is to take thyroid hormone pills. You should start to feel better in 1 to 2 weeks. But it can take several months to see changes in the TSH level. You will need regular visits with your doctor to make sure you have the right dose of medicine. Most people need treatment for the rest of their lives. You will need to see your doctor regularly to have blood tests and to make sure you are doing well. Follow-up care is a key part of your treatment and safety. Be sure to make and go to all appointments, and call your doctor if you are having problems. Its also a good idea to know your test results and keep a list of the medicines you take. How can you care for yourself at home? · Take your thyroid hormone medicine exactly as prescribed. Call your doctor if you think you are having a problem with your medicine. Most people do not have side effects if they take the right amount of medicine regularly. ¨ Take the medicine 30 minutes before breakfast, and do not take it with calcium, vitamins, or iron. ¨ Do not take extra doses of your thyroid medicine. It will not help you get better any faster, and it may cause side effects. ¨ If you forget to take a dose, do NOT take a double dose of medicine. Take your usual dose the next day. · Tell your doctor about all prescription, herbal, or over-the-counter products you take. · Take care of yourself. Eat a healthy diet, get enough sleep, and get regular exercise. When should you call for help? Call 911 anytime you think you may need emergency care. For example, call if: 
· You passed out (lost consciousness). · You have severe trouble breathing. · You have a very slow heartbeat (less than 60 beats a minute). · You have a low body temperature (95°F or below). Call your doctor now or seek immediate medical care if: 
· You feel tired, sluggish, or weak. · You have trouble remembering things or concentrating. · You do not begin to feel better 2 weeks after starting your medicine. Watch closely for changes in your health, and be sure to contact your doctor if you have any problems. Where can you learn more? Go to http://solitario-star.info/. Enter H141 in the search box to learn more about \"Hypothyroidism: Care Instructions. \" Current as of: July 28, 2016 Content Version: 11.3 © 9758-0572 "[employer]. Care instructions adapted under license by [employer] (which disclaims liability or warranty for this information).  If you have questions about a medical condition or this instruction, always ask your healthcare professional. [employer] disclaims any warranty or liability for your use of this information. Back Stretches: Exercises Your Care Instructions Here are some examples of exercises for stretching your back. Start each exercise slowly. Ease off the exercise if you start to have pain. Your doctor or physical therapist will tell you when you can start these exercises and which ones will work best for you. How to do the exercises Overhead stretch 1. Stand comfortably with your feet shoulder-width apart. 2. Looking straight ahead, raise both arms over your head and reach toward the ceiling. Do not allow your head to tilt back. 3. Hold for 15 to 30 seconds, then lower your arms to your sides. 4. Repeat 2 to 4 times. Side stretch 1. Stand comfortably with your feet shoulder-width apart. 2. Raise one arm over your head, and then lean to the other side. 3. Slide your hand down your leg as you let the weight of your arm gently stretch your side muscles. Hold for 15 to 30 seconds. 4. Repeat 2 to 4 times on each side. Press-up 1. Lie on your stomach, supporting your body with your forearms. 2. Press your elbows down into the floor to raise your upper back. As you do this, relax your stomach muscles and allow your back to arch without using your back muscles. As your press up, do not let your hips or pelvis come off the floor. 3. Hold for 15 to 30 seconds, then relax. 4. Repeat 2 to 4 times. Relax and rest 
 
1. Lie on your back with a rolled towel under your neck and a pillow under your knees. Extend your arms comfortably to your sides. 2. Relax and breathe normally. 3. Remain in this position for about 10 minutes. 4. If you can, do this 2 or 3 times each day. Follow-up care is a key part of your treatment and safety. Be sure to make and go to all appointments, and call your doctor if you are having problems. It's also a good idea to know your test results and keep a list of the medicines you take. Where can you learn more? Go to http://solitario-star.info/. Enter H552 in the search box to learn more about \"Back Stretches: Exercises. \" Current as of: March 21, 2017 Content Version: 11.3 © 8640-3537 [employer]. Care instructions adapted under license by [employer] (which disclaims liability or warranty for this information). If you have questions about a medical condition or this instruction, always ask your healthcare professional. [address] any warranty or liability for your use of this information. Introducing hospitals & HEALTH SERVICES! Dear Elizabeth Pineda: Thank you for requesting a Linea account. Our records indicate that you already have an active Linea account. You can access your account anytime at [email or web address] To Buy Did you know that you can access your hospital and ER discharge instructions at any time in Linea? You can also review all of your test results from your hospital stay or ER visit. Additional Information If you have questions, please visit the Frequently Asked Questions section of the Linea website at [email or web address] To Buy/. Remember, Linea is NOT to be used for urgent needs. For medical emergencies, dial 911. Now available from your iPhone and Android! Please provide this summary of care documentation to your next provider. Your primary care clinician is listed as Rosmery Gardner. If you have any questions after today's visit, please call [phone].

## 2017-10-16 NOTE — PROGRESS NOTES
Chief Complaint   Patient presents with    Anemia     Follow up    Thyroid Problem    Vitamin D Deficiency    Fatigue    Back Pain     1. Have you been to the ER, urgent care clinic since your last visit? Hospitalized since your last visit? No    2. Have you seen or consulted any other health care providers outside of the [address] [place] [address] since your last visit? Include any pap smears or colon screening.  No

## 2017-10-17 LAB
25(OH)D3+25(OH)D2 SERPL-MCNC: 25.9 NG/ML (ref 30–100)
ALBUMIN SERPL-MCNC: 4.5 G/DL (ref 3.5–5.5)
ALBUMIN/GLOB SERPL: 1.4 {RATIO} (ref 1.2–2.2)
ALP SERPL-CCNC: 58 IU/L (ref 39–117)
ALT SERPL-CCNC: 17 IU/L (ref 0–32)
AST SERPL-CCNC: 24 IU/L (ref 0–40)
BASOPHILS # BLD AUTO: 0 X10E3/UL (ref 0–0.2)
BASOPHILS NFR BLD AUTO: 0 %
BILIRUB SERPL-MCNC: 0.2 MG/DL (ref 0–1.2)
BUN SERPL-MCNC: 17 MG/DL (ref 6–20)
BUN/CREAT SERPL: 25 (ref 9–23)
CALCIUM SERPL-MCNC: 9.7 MG/DL (ref 8.7–10.2)
CHLORIDE SERPL-SCNC: 101 MMOL/L (ref 96–106)
CO2 SERPL-SCNC: 22 MMOL/L (ref 18–29)
CREAT SERPL-MCNC: 0.67 MG/DL (ref 0.57–1)
EOSINOPHIL # BLD AUTO: 0.3 X10E3/UL (ref 0–0.4)
EOSINOPHIL NFR BLD AUTO: 3 %
ERYTHROCYTE [DISTWIDTH] IN BLOOD BY AUTOMATED COUNT: 15.5 % (ref 12.3–15.4)
EST. AVERAGE GLUCOSE BLD GHB EST-MCNC: 114 MG/DL
GLOBULIN SER CALC-MCNC: 3.3 G/DL (ref 1.5–4.5)
GLUCOSE SERPL-MCNC: 125 MG/DL (ref 65–99)
HBA1C MFR BLD: 5.6 % (ref 4.8–5.6)
HCT VFR BLD AUTO: 33 % (ref 34–46.6)
HGB BLD-MCNC: 10.5 G/DL (ref 11.1–15.9)
IMM GRANULOCYTES # BLD: 0 X10E3/UL (ref 0–0.1)
IMM GRANULOCYTES NFR BLD: 0 %
IRON SATN MFR SERPL: 30 % (ref 15–55)
IRON SERPL-MCNC: 81 UG/DL (ref 27–159)
LYMPHOCYTES # BLD AUTO: 2.8 X10E3/UL (ref 0.7–3.1)
LYMPHOCYTES NFR BLD AUTO: 35 %
MCH RBC QN AUTO: 20.2 PG (ref 26.6–33)
MCHC RBC AUTO-ENTMCNC: 31.8 G/DL (ref 31.5–35.7)
MCV RBC AUTO: 63 FL (ref 79–97)
MONOCYTES # BLD AUTO: 0.4 X10E3/UL (ref 0.1–0.9)
MONOCYTES NFR BLD AUTO: 5 %
NEUTROPHILS # BLD AUTO: 4.5 X10E3/UL (ref 1.4–7)
NEUTROPHILS NFR BLD AUTO: 57 %
PLATELET # BLD AUTO: 348 X10E3/UL (ref 150–379)
POTASSIUM SERPL-SCNC: 4 MMOL/L (ref 3.5–5.2)
PROT SERPL-MCNC: 7.8 G/DL (ref 6–8.5)
RBC # BLD AUTO: 5.21 X10E6/UL (ref 3.77–5.28)
SODIUM SERPL-SCNC: 141 MMOL/L (ref 134–144)
T4 FREE SERPL-MCNC: 1.25 NG/DL (ref 0.82–1.77)
TIBC SERPL-MCNC: 273 UG/DL (ref 250–450)
TSH SERPL DL<=0.005 MIU/L-ACNC: 2.63 UIU/ML (ref 0.45–4.5)
UIBC SERPL-MCNC: 192 UG/DL (ref 131–425)
WBC # BLD AUTO: 8 X10E3/UL (ref 3.4–10.8)

## 2017-10-17 NOTE — PROGRESS NOTES
Kyle martinez,  I have reviewed your results. Iron panel normal, so no need to take iron pills.  Anemia from your Thalassemia,   Thyroid function normal, so to continue on 1 tablet 5 days a week and 2 tablets on sat/Sun  Vitamin D is stable, can continue with OTC vitamin d3 2000 units  Average blood sugar, kidney, liver are very normal  thanks

## 2017-11-08 RX ORDER — LEVOTHYROXINE SODIUM 25 UG/1
TABLET ORAL
Qty: 90 TAB | Refills: 0 | Status: SHIPPED | OUTPATIENT
Start: 2017-11-08 | End: 2018-02-09 | Stop reason: SDUPTHER

## 2017-11-08 RX ORDER — LEVOTHYROXINE SODIUM 25 UG/1
TABLET ORAL
Qty: 90 TAB | Refills: 0 | Status: CANCELLED | OUTPATIENT
Start: 2017-11-08

## 2017-11-08 NOTE — TELEPHONE ENCOUNTER
Refill request:   Requested Prescriptions     Pending Prescriptions Disp Refills    levothyroxine (SYNTHROID) 25 mcg tablet 90 Tab 0     Change pharmacy to [address]

## 2018-02-09 RX ORDER — LEVOTHYROXINE SODIUM 25 UG/1
TABLET ORAL
Qty: 90 TAB | Refills: 0 | Status: SHIPPED | COMMUNITY
Start: 2018-02-09 | End: 2018-04-22 | Stop reason: SDUPTHER

## 2018-03-12 ENCOUNTER — OFFICE VISIT (OUTPATIENT)
Dept: FAMILY MEDICINE CLINIC | Age: 38
End: 2018-03-12

## 2018-03-12 VITALS
BODY MASS INDEX: 32.05 KG/M2 | RESPIRATION RATE: 16 BRPM | DIASTOLIC BLOOD PRESSURE: 70 MMHG | HEIGHT: 59 IN | WEIGHT: 159 LBS | HEART RATE: 91 BPM | TEMPERATURE: 98.2 F | OXYGEN SATURATION: 99 % | SYSTOLIC BLOOD PRESSURE: 108 MMHG

## 2018-03-12 DIAGNOSIS — R41.3 MEMORY CHANGE: ICD-10-CM

## 2018-03-12 DIAGNOSIS — E55.9 VITAMIN D DEFICIENCY: ICD-10-CM

## 2018-03-12 DIAGNOSIS — R53.82 CHRONIC FATIGUE: ICD-10-CM

## 2018-03-12 DIAGNOSIS — D50.9 MICROCYTIC HYPOCHROMIC ANEMIA: ICD-10-CM

## 2018-03-12 DIAGNOSIS — E03.9 HYPOTHYROIDISM IN ADULT: Primary | ICD-10-CM

## 2018-03-12 DIAGNOSIS — R73.03 PREDIABETES: ICD-10-CM

## 2018-03-12 NOTE — MR AVS SNAPSHOT
[address] [place], [place] [id number] Patient: Racheal Loya MRN: [id number] VXB:[id number] Visit Information Date & Time Provider Department Dept. Phone Encounter #  
 3/12/2018  1:00 PM Dylan Cortés, [address] [phone] [id number] Follow-up Instructions Return in about 4 months (around 7/12/2018) for follow up, Thyroid, anemia. Upcoming Health Maintenance Date Due  
 PAP AKA CERVICAL CYTOLOGY 3/7/2019 DTaP/Tdap/Td series (2 - Td) 8/8/2026 Allergies as of 3/12/2018  Review Complete On: 3/12/2018 By: Dylan Cortés MD  
 No Known Allergies Current Immunizations  Reviewed on 1/9/2017 Name Date Tdap 8/8/2016 Not reviewed this visit You Were Diagnosed With   
  
 Codes Comments Hypothyroidism in adult    -  Primary ICD-10-CM: E03.9 ICD-9-CM: 244.9 Microcytic hypochromic anemia     ICD-10-CM: D50.9 ICD-9-CM: 280.9 Memory change     ICD-10-CM: R41.3 ICD-9-CM: 780.93 Chronic fatigue     ICD-10-CM: R53.82 
ICD-9-CM: 780.79 Vitamin D deficiency     ICD-10-CM: E55.9 ICD-9-CM: 268.9 Prediabetes     ICD-10-CM: R73.03 
ICD-9-CM: 790.29 Body mass index (BMI) greater than 25     ICD-10-CM: Z78.9 ICD-9-CM: V49.89 Vitals BP Pulse Temp Resp Height(growth percentile) Weight(growth percentile) 108/70 (BP 1 Location: Right arm, BP Patient Position: Sitting) 91 98.2 °F (36.8 °C) (Axillary) 16 4' 11\" (1.499 m) 159 lb (72.1 kg) LMP SpO2 BMI OB Status Smoking Status 02/26/2018 99% 32.11 kg/m2 Breastfeeding Never Smoker Vitals History BMI and BSA Data Body Mass Index Body Surface Area  
 32.11 kg/m 2 1.73 m 2 Preferred Pharmacy Pharmacy Name Phone [facility], [address] [place] [address],[facility] Floor [phone] Your Updated Medication List  
  
   
 This list is accurate as of 3/12/18  1:33 PM.  Always use your most recent med list.  
  
  
  
  
 ferrous sulfate 325 mg (65 mg iron) EC tablet Commonly known as:  IRON Take 1 Tab by mouth Before breakfast and dinner. levothyroxine 25 mcg tablet Commonly known as:  SYNTHROID  
TAKE ONE TABLET BY MOUTH ONCE DAILY BEFORE BREAKFAST FIVE DAYS A WEEK. ON SATURDAY AND SUNDAY TAKE 50 MCG PRENATAL #2 PO Take  by mouth. VITAMIN D3 2,000 unit Tab Generic drug:  cholecalciferol (vitamin D3) Take  by mouth. Takes 1 tab daily. We Performed the Following CBC WITH AUTOMATED DIFF [50210 CPT(R)] HEMOGLOBIN A1C WITH EAG [13657 CPT(R)] TSH AND FREE T4 [37749 CPT(R)] VITAMIN B12 & FOLATE [12792 CPT(R)] VITAMIN D, 25 HYDROXY [id number] CPT(R)] Follow-up Instructions Return in about 4 months (around 7/12/2018) for follow up, Thyroid, anemia. Patient Instructions Learning About Attention Deficit Hyperactivity Disorder (ADHD) in Adults What is ADHD? Attention deficit hyperactivity disorder (ADHD) is a condition in which people have a hard time paying attention. Adults with ADHD also may be more active than normal. They tend to act without thinking. ADHD may make it harder for them to focus, get organized, and finish tasks. ADHD most often starts in childhood and lasts into adulthood. Many adults don't know that they have ADHD until their children are diagnosed. Then they begin to see their own symptoms. Doctors don't know what causes ADHD. But it tends to run in families. What are the symptoms? The most common types of ADHD symptoms in adults are attention problems and hyperactivity. Attention problems Adults with ADHD often find it hard to: · Finish tasks that don't interest them or aren't easy. But they may become obsessed with activities that they find interesting and enjoy. · Keep relationships. · Focus their attention on conversations, reading materials, or jobs. They may change jobs a lot. · Remember things. They may misplace or lose things. · Pay attention. They are easily distracted. They find it hard to focus on one task. · Think before they act. They may make quick decisions. They may act before they think about the effect of their actions. Hyperactivity Adults with ADHD may: · Fidget. They may swing their legs, shift in their seats, or tap their fingers. · Move around a lot. They may feel \"revved up\" or on the go. They may not be able to slow down until they are very tired. · Find it hard to relax. They may feel restless and find it hard to do quiet things like read or watch TV. How does ADHD affect daily life? ADHD in adults may affect: · Job performance. They may find it hard to organize their work, manage their time, and focus on one task at a time. They may forget, misplace, or lose things. They may quit their jobs out of boredom. · Relationships. Adults with ADHD may find it hard to focus their attention on conversations. It is hard for them to \"read\" the behavior and moods of others and express their own feelings. · Temper. They may get easily frustrated. This often can make it harder for them to deal with stress. These adults may overreact and have a short, quick temper. · The ability to solve problems. Adults who have a hard time waiting for things they want may act before they think about the effect of their actions. They may take part in risky behaviors. These include unprotected sex, unsafe driving, alcohol and drug use, or unwise business ventures. How is ADHD treated? ?ADHD can be treated with medicines, behavior training, or counseling. Or it may be a combination of these treatments. Medicines ? Stimulant medicines are most often used to treat ADHD. These may include: 
? · Amphetamines (such as Adderall and Dexedrine). ? · Methylphenidate (such as Concerta, Daytrana, Focalin, Metadate, and Ritalin). ? Other medicines that may be used are: 
? · Atomoxetine, such as Strattera, a nonstimulant medicine for ADHD. ? · Antihypertensives. These include clonidine (such as Catapres) and guanfacine (such as Tenex). ? · Antidepressants, which include bupropion (Wellbutrin). ?Behavior training ? Behavior training can help adults with ADHD learn how to: 
? · Get organized. A daily organizer or planner can help these adults organize their daily tasks. They can write down appointments and other things they need to remember. ? · Decrease distractions. They can set up their work or home environment so that there are fewer things that will distract them. They may find using headphones or a \"white noise\" machine helpful. College students can arrange a quiet living situation. They may need a single dorm room. ? · Work on relationships. Social skills training can help adults with ADHD relate to family, friends, and coworkers. Couples counseling or family therapy can also help improve relationships. ? Counseling ? Counseling is not meant to treat inattention, hyperactivity, or impulsiveness. But it can help with some of the problems that go along with ADHD. These include not getting along well with others and having problems following rules. Where can you learn more? Go to http://solitario-star.info/. Enter V508 in the search box to learn more about \"Learning About Attention Deficit Hyperactivity Disorder (ADHD) in Adults. \" Current as of: May 12, 2017 Content Version: 11.4 © 0983-0270 [employer]CAD. Care instructions adapted under license by [employer] (which disclaims liability or warranty for this information).  If you have questions about a medical condition or this instruction, always ask your healthcare professional. [employer] disclaims any warranty or liability for your use of this information. Introducing [facility]! Dear Waldemar Wayne: Thank you for requesting a Easy-Point account. Our records indicate that you already have an active Easy-Point account. You can access your account anytime at [email or web address] Did you know that you can access your hospital and ER discharge instructions at any time in Easy-Point? You can also review all of your test results from your hospital stay or ER visit. Additional Information If you have questions, please visit the Frequently Asked Questions section of the Easy-Point website at [email or web address] Remember, Easy-Point is NOT to be used for urgent needs. For medical emergencies, dial 911. Now available from your iPhone and Android! Please provide this summary of care documentation to your next provider. Your primary care clinician is listed as Reyes Sicilian. If you have any questions after today's visit, please call [phone].

## 2018-03-12 NOTE — PROGRESS NOTES
HISTORY OF PRESENT ILLNESS  Jennifer Posadas is a 41 y.o. female. She was seen for follow up on thyroid, anemia, concern of recent weight gain, memory changes, irritability. HPI  Endocrine Review  Patient is seen for followup of hypothyroidism and prediabetes. Since last visit: synthroid dose was decreased  She reports medication compliance: all the time and is taking separate from all her other meds. She reports the following concerns/problems/med side effects: none. Lab review: labs reviewed and discussed with patient. On Synthroid 25 mcg 5 days a week and 50 mcg on sat/Sun    Lab Results   Component Value Date/Time    TSH 2.630 10/16/2017 05:00 PM    T4, Free 1.25 10/16/2017 05:00 PM     Lab Results   Component Value Date/Time    Hemoglobin A1c 5.6 10/16/2017 05:00 PM          Anemia  Patient presents for presents evaluation of anemia. Anemia was found by routine blood work. It has been present for several years. Associated signs & symptoms: fatigue, dizziness/lightheadedness. She has been diagnosed with thalassemia minor in past  Lab Results   Component Value Date/Time    WBC 8.0 10/16/2017 05:00 PM    HGB 10.5 (L) 10/16/2017 05:00 PM    HCT 33.0 (L) 10/16/2017 05:00 PM    PLATELET 437 27/35/4137 05:00 PM    MCV 63 (L) 10/16/2017 05:00 PM     As per her she forgets things to do. She has problem concentrating on stuffs. Taking care of 10 y/o daughter and 1 &1/1 y/o son. She is breast feeding . Review of Systems   Constitutional: Positive for malaise/fatigue. Negative for chills and fever. Weight gain   HENT: Negative for congestion, ear pain, sore throat and tinnitus. Eyes: Negative for blurred vision, double vision, pain and discharge. Respiratory: Negative for cough, shortness of breath and wheezing. Cardiovascular: Negative for chest pain, palpitations and leg swelling. Gastrointestinal: Negative for abdominal pain, blood in stool, constipation, diarrhea, nausea and vomiting. Genitourinary: Negative for dysuria, frequency, hematuria and urgency. Musculoskeletal: Negative for back pain, joint pain and myalgias. Skin: Negative for rash. Neurological: Negative for dizziness, tremors, seizures and headaches. Endo/Heme/Allergies: Negative for polydipsia. Does not bruise/bleed easily. Psychiatric/Behavioral: Positive for memory loss. Negative for depression and substance abuse. The patient is nervous/anxious. Physical Exam   Constitutional: She is oriented to person, place, and time. She appears well-developed and well-nourished. HENT:   Head: Normocephalic and atraumatic. Right Ear: External ear normal.   Mouth/Throat: Oropharynx is clear and moist. No oropharyngeal exudate. Eyes: Conjunctivae and EOM are normal. Pupils are equal, round, and reactive to light. No scleral icterus. Neck: Normal range of motion. Neck supple. No JVD present. No thyromegaly present. Cardiovascular: Normal rate, regular rhythm, normal heart sounds and intact distal pulses. No murmur heard. Pulmonary/Chest: Effort normal and breath sounds normal. She has no wheezes. Abdominal: Soft. Bowel sounds are normal. She exhibits no distension and no mass. Musculoskeletal: Normal range of motion. She exhibits no edema or tenderness. Lymphadenopathy:     She has no cervical adenopathy. Neurological: She is alert and oriented to person, place, and time. She has normal reflexes. No cranial nerve deficit. Skin: Skin is warm and dry. No rash noted. She is not diaphoretic. Psychiatric: She has a normal mood and affect. Nursing note and vitals reviewed. ASSESSMENT and PLAN  Diagnoses and all orders for this visit:    1. Hypothyroidism in adult  -     TSH AND FREE T4    2. Microcytic hypochromic anemia  -     CBC WITH AUTOMATED DIFF    3. Memory change  -     VITAMIN B12 & FOLATE   ADD vs thyroid issues vs Vitamin deficiency  4.  Chronic fatigue  -     VITAMIN D, 25 HYDROXY    5. Vitamin D deficiency    6. Prediabetes  -     HEMOGLOBIN A1C WITH EAG    7. Body mass index (BMI) greater than 25  Discussed abnormal weight, ideal BMI and importance of diet and exercise to loose weight. Referral for exercise program was offered. Printed information about diet and lifestyle modification provided. Discussed lifestyle issues and health guidance given  Patient was given an after visit summary which includes diagnoses, vital signs, current medications, instructions and references & authorized prescriptions . Results of labs will be conveyed to patient, once available. Pt verbalized instructions I provided and expressed understanding of discussion that was held today. Follow-up Disposition:  Return in about 4 months (around 7/12/2018) for follow up, Thyroid, anemia.

## 2018-03-12 NOTE — PROGRESS NOTES
Chief Complaint   Patient presents with    Fatigue     concerned about weight gain     Other     concerned about forgetfulness     Other     concerned about becoming angry quickly     1. Have you been to the ER, urgent care clinic since your last visit? Hospitalized since your last visit? No    2. Have you seen or consulted any other health care providers outside of the [address] [place] [address] since your last visit? Include any pap smears or colon screening.  No

## 2018-03-12 NOTE — PATIENT INSTRUCTIONS
Learning About Attention Deficit Hyperactivity Disorder (ADHD) in Adults  What is ADHD? Attention deficit hyperactivity disorder (ADHD) is a condition in which people have a hard time paying attention. Adults with ADHD also may be more active than normal. They tend to act without thinking. ADHD may make it harder for them to focus, get organized, and finish tasks. ADHD most often starts in childhood and lasts into adulthood. Many adults don't know that they have ADHD until their children are diagnosed. Then they begin to see their own symptoms. Doctors don't know what causes ADHD. But it tends to run in families. What are the symptoms? The most common types of ADHD symptoms in adults are attention problems and hyperactivity. Attention problems  Adults with ADHD often find it hard to:  · Finish tasks that don't interest them or aren't easy. But they may become obsessed with activities that they find interesting and enjoy. · Keep relationships. · Focus their attention on conversations, reading materials, or jobs. They may change jobs a lot. · Remember things. They may misplace or lose things. · Pay attention. They are easily distracted. They find it hard to focus on one task. · Think before they act. They may make quick decisions. They may act before they think about the effect of their actions. Hyperactivity  Adults with ADHD may:  · Fidget. They may swing their legs, shift in their seats, or tap their fingers. · Move around a lot. They may feel \"revved up\" or on the go. They may not be able to slow down until they are very tired. · Find it hard to relax. They may feel restless and find it hard to do quiet things like read or watch TV. How does ADHD affect daily life? ADHD in adults may affect:  · Job performance. They may find it hard to organize their work, manage their time, and focus on one task at a time. They may forget, misplace, or lose things.  They may quit their jobs out of boredom. · Relationships. Adults with ADHD may find it hard to focus their attention on conversations. It is hard for them to \"read\" the behavior and moods of others and express their own feelings. · Temper. They may get easily frustrated. This often can make it harder for them to deal with stress. These adults may overreact and have a short, quick temper. · The ability to solve problems. Adults who have a hard time waiting for things they want may act before they think about the effect of their actions. They may take part in risky behaviors. These include unprotected sex, unsafe driving, alcohol and drug use, or unwise business ventures. How is ADHD treated? ?ADHD can be treated with medicines, behavior training, or counseling. Or it may be a combination of these treatments. Medicines  ? Stimulant medicines are most often used to treat ADHD. These may include:  ? · Amphetamines (such as Adderall and Dexedrine). ? · Methylphenidate (such as Concerta, Daytrana, Focalin, Metadate, and Ritalin). ? Other medicines that may be used are:  ? · Atomoxetine, such as Strattera, a nonstimulant medicine for ADHD. ? · Antihypertensives. These include clonidine (such as Catapres) and guanfacine (such as Tenex). ? · Antidepressants, which include bupropion (Wellbutrin). ?Behavior training  ? Behavior training can help adults with ADHD learn how to:  ? · Get organized. A daily organizer or planner can help these adults organize their daily tasks. They can write down appointments and other things they need to remember. ? · Decrease distractions. They can set up their work or home environment so that there are fewer things that will distract them. They may find using headphones or a \"white noise\" machine helpful. College students can arrange a quiet living situation. They may need a single dorm room. ? · Work on relationships. Social skills training can help adults with ADHD relate to family, friends, and coworkers. Couples counseling or family therapy can also help improve relationships. ? Counseling  ? Counseling is not meant to treat inattention, hyperactivity, or impulsiveness. But it can help with some of the problems that go along with ADHD. These include not getting along well with others and having problems following rules. Where can you learn more? Go to http://solitario-star.info/. Enter L021 in the search box to learn more about \"Learning About Attention Deficit Hyperactivity Disorder (ADHD) in Adults. \"  Current as of: May 12, 2017  Content Version: 11.4  © 1157-2208 [employer]. Care instructions adapted under license by [employer] (which disclaims liability or warranty for this information). If you have questions about a medical condition or this instruction, always ask your healthcare professional. [address] any warranty or liability for your use of this information.

## 2018-03-13 LAB
25(OH)D3+25(OH)D2 SERPL-MCNC: 21.2 NG/ML (ref 30–100)
BASOPHILS # BLD AUTO: 0 X10E3/UL (ref 0–0.2)
BASOPHILS NFR BLD AUTO: 0 %
EOSINOPHIL # BLD AUTO: 0.3 X10E3/UL (ref 0–0.4)
EOSINOPHIL NFR BLD AUTO: 3 %
ERYTHROCYTE [DISTWIDTH] IN BLOOD BY AUTOMATED COUNT: 16.2 % (ref 12.3–15.4)
EST. AVERAGE GLUCOSE BLD GHB EST-MCNC: 111 MG/DL
FOLATE SERPL-MCNC: >20 NG/ML
HBA1C MFR BLD: 5.5 % (ref 4.8–5.6)
HCT VFR BLD AUTO: 34 % (ref 34–46.6)
HGB BLD-MCNC: 10.5 G/DL (ref 11.1–15.9)
IMM GRANULOCYTES # BLD: 0 X10E3/UL (ref 0–0.1)
IMM GRANULOCYTES NFR BLD: 0 %
LYMPHOCYTES # BLD AUTO: 2.6 X10E3/UL (ref 0.7–3.1)
LYMPHOCYTES NFR BLD AUTO: 31 %
MCH RBC QN AUTO: 20.2 PG (ref 26.6–33)
MCHC RBC AUTO-ENTMCNC: 30.9 G/DL (ref 31.5–35.7)
MCV RBC AUTO: 65 FL (ref 79–97)
MONOCYTES # BLD AUTO: 0.4 X10E3/UL (ref 0.1–0.9)
MONOCYTES NFR BLD AUTO: 4 %
NEUTROPHILS # BLD AUTO: 5.1 X10E3/UL (ref 1.4–7)
NEUTROPHILS NFR BLD AUTO: 62 %
PLATELET # BLD AUTO: 357 X10E3/UL (ref 150–379)
RBC # BLD AUTO: 5.21 X10E6/UL (ref 3.77–5.28)
T4 FREE SERPL-MCNC: 1.43 NG/DL (ref 0.82–1.77)
TSH SERPL DL<=0.005 MIU/L-ACNC: 3.85 UIU/ML (ref 0.45–4.5)
VIT B12 SERPL-MCNC: 359 PG/ML (ref 232–1245)
WBC # BLD AUTO: 8.4 X10E3/UL (ref 3.4–10.8)

## 2018-03-13 NOTE — PROGRESS NOTES
Kyle Rodriguez,  I have reviewed your results. Thyroid function completely normal, no change in dose for now  CBC shows low hemoglobin but stable and same as last time. Average blood sugar normal too and Vitamin B12 level also normal.  Vitamin D is borderline low, to continue with Vitamin d 2000 units daily. So nothing apparent on your results.  As discussed, you can try Ayurvedic medicine ( Shatavari and Brakatherinei ) to help with your symptoms  thanks

## 2018-04-22 RX ORDER — LEVOTHYROXINE SODIUM 25 UG/1
TABLET ORAL
Qty: 90 TAB | Refills: 0 | Status: SHIPPED | OUTPATIENT
Start: 2018-04-22 | End: 2019-02-06 | Stop reason: DRUGHIGH

## 2018-04-23 RX ORDER — LEVOTHYROXINE SODIUM 25 UG/1
TABLET ORAL
Qty: 90 TAB | Refills: 0 | Status: SHIPPED | OUTPATIENT
Start: 2018-04-23 | End: 2019-02-06 | Stop reason: DRUGHIGH

## 2019-01-17 ENCOUNTER — TELEPHONE (OUTPATIENT)
Dept: FAMILY MEDICINE CLINIC | Age: 39
End: 2019-01-17

## 2019-01-17 NOTE — TELEPHONE ENCOUNTER
Outbound call to patient. No answer. Left message that I was returning her call.      Per provider labs will be ordered at visit

## 2019-01-17 NOTE — TELEPHONE ENCOUNTER
Can wait till she has an appointment. I see,she doesn't have insurance.  Will not prefer,lab order before visit,will be more confusing for [facility]  thanks

## 2019-01-17 NOTE — TELEPHONE ENCOUNTER
Patient is calling wanting to know if she can come into the office to get blood work done for [employer]. Pt upcoming appointment for Thyroids is Feb. 6, 2019. Ricardo Bermeo callback:[phone]    LOV:  Monday, March 12, 2018

## 2019-02-06 ENCOUNTER — OFFICE VISIT (OUTPATIENT)
Dept: FAMILY MEDICINE CLINIC | Age: 39
End: 2019-02-06

## 2019-02-06 VITALS
HEART RATE: 76 BPM | SYSTOLIC BLOOD PRESSURE: 108 MMHG | RESPIRATION RATE: 16 BRPM | WEIGHT: 164 LBS | BODY MASS INDEX: 33.06 KG/M2 | TEMPERATURE: 97.5 F | OXYGEN SATURATION: 99 % | DIASTOLIC BLOOD PRESSURE: 72 MMHG | HEIGHT: 59 IN

## 2019-02-06 DIAGNOSIS — R41.3 MEMORY CHANGE: ICD-10-CM

## 2019-02-06 DIAGNOSIS — K58.0 IRRITABLE BOWEL SYNDROME WITH DIARRHEA: ICD-10-CM

## 2019-02-06 DIAGNOSIS — E55.9 VITAMIN D DEFICIENCY: ICD-10-CM

## 2019-02-06 DIAGNOSIS — E66.9 OBESITY (BMI 30.0-34.9): ICD-10-CM

## 2019-02-06 DIAGNOSIS — E03.9 HYPOTHYROIDISM IN ADULT: Primary | ICD-10-CM

## 2019-02-06 DIAGNOSIS — R73.03 PREDIABETES: ICD-10-CM

## 2019-02-06 DIAGNOSIS — D50.9 MICROCYTIC HYPOCHROMIC ANEMIA: ICD-10-CM

## 2019-02-06 RX ORDER — CHLORDIAZEPOXIDE HYDROCHLORIDE AND CLIDINIUM BROMIDE 5; 2.5 MG/1; MG/1
1 CAPSULE ORAL
Qty: 60 CAP | Refills: 0 | Status: SHIPPED | OUTPATIENT
Start: 2019-02-06 | End: 2019-03-08

## 2019-02-06 RX ORDER — LEVOTHYROXINE SODIUM 50 UG/1
TABLET ORAL
COMMUNITY
End: 2019-02-06 | Stop reason: DRUGHIGH

## 2019-02-06 RX ORDER — METFORMIN HYDROCHLORIDE 500 MG/1
500 TABLET, EXTENDED RELEASE ORAL
Qty: 30 TAB | Refills: 2 | Status: SHIPPED | OUTPATIENT
Start: 2019-02-06 | End: 2019-06-19 | Stop reason: ALTCHOICE

## 2019-02-06 RX ORDER — LEVOTHYROXINE SODIUM 75 UG/1
TABLET ORAL
COMMUNITY
End: 2019-03-05 | Stop reason: SDUPTHER

## 2019-02-06 NOTE — PROGRESS NOTES
HISTORY OF PRESENT ILLNESS Irina Juan is a 42 y.o. female. she was seen for multiple concerns. She was seen for follow up on thyroid, anemia, concern of recent weight gain, memory changes, irritability. She was following Dr Dakota Crow last year due to insurance coverage. HPI Endocrine Review Patient is seen for followup of hypothyroidism and prediabetes. Since last visit: synthroid dose was decreased  She reports medication compliance: all the time and is taking separate from all her other meds. She reports the following concerns/problems/med side effects: none. Lab review: labs reviewed and discussed with patient. On Synthroid 75 mcg 5 days a week and 50 mcg on sat/Sun 
  
Had blood work done with Dr Dakota Crow 2 months back , dose was adjusted at that time. 
  
Anemia Patient presents for presents evaluation of anemia. Anemia was found by routine blood work. It has been present for several years. Associated signs & symptoms: fatigue, dizziness/lightheadedness. She has been diagnosed with thalassemia minor in past 
 
Irritable Bowel Syndrome Patient complains of irritable bowel syndrome, abdominal cramping. Onset of symptoms was several months ago. She describes symptoms as crampy abdominal pain: mild: location: in the lower abdomen 
loose stools occurring 3 times per day 
short transit time 
anxiety, currently mild 
depression, currently mild. Patient denies weight loss, melena, hematochezia, fever, abnormal vaginal bleeding, pelvic pain, urinary symptoms, changes in bowel movements, dizziness or lightheaded feeling. Course to date has been symptoms have progressed to a point and plateaued. . Previous visits for this problem: none. Evaluation to date has been patient had work by previous PCP. Had food allergy test which was normal. US of abdomen showing fatty liver changes and 1 cm size gall stone. was referred to surgeon, and was reassured. she was advised to follow up with GI 
 Treatment to date has been none. As per her she forgets things to do. She has problem concentrating on stuffs. She has same concern on last visit in 03/2018 also Taking care of 10 y/o daughter and 3 y/o son. Her daughter has been diagnosed with ADHD Review of Systems Constitutional: Positive for malaise/fatigue. Negative for chills and fever. HENT: Negative for congestion, ear pain, sore throat and tinnitus. Eyes: Negative for blurred vision, double vision, pain and discharge. Respiratory: Negative for cough, shortness of breath and wheezing. Cardiovascular: Negative for chest pain, palpitations and leg swelling. Gastrointestinal: Positive for abdominal pain and diarrhea. Negative for blood in stool, constipation, nausea and vomiting. Genitourinary: Negative for dysuria, frequency, hematuria and urgency. Musculoskeletal: Negative for back pain, joint pain and myalgias. Skin: Negative for rash. Neurological: Negative for dizziness, tremors, seizures and headaches. Endo/Heme/Allergies: Negative for polydipsia. Does not bruise/bleed easily. Psychiatric/Behavioral: Positive for depression. Negative for substance abuse. The patient is nervous/anxious. Physical Exam  
Constitutional: She is oriented to person, place, and time. She appears well-developed and well-nourished. HENT:  
Head: Normocephalic and atraumatic. Right Ear: External ear normal.  
Mouth/Throat: Oropharynx is clear and moist. No oropharyngeal exudate. Eyes: Conjunctivae and EOM are normal. Pupils are equal, round, and reactive to light. No scleral icterus. Neck: Normal range of motion. Neck supple. No JVD present. No thyromegaly present. Cardiovascular: Normal rate, regular rhythm, normal heart sounds and intact distal pulses. No murmur heard. Pulmonary/Chest: Effort normal and breath sounds normal. She has no wheezes. Abdominal: Soft.  Bowel sounds are normal. She exhibits no distension and no mass. Musculoskeletal: Normal range of motion. She exhibits no edema or tenderness. Lymphadenopathy:  
  She has no cervical adenopathy. Neurological: She is alert and oriented to person, place, and time. She has normal reflexes. No cranial nerve deficit. Skin: Skin is warm and dry. No rash noted. She is not diaphoretic. Psychiatric: She has a normal mood and affect. Nursing note and vitals reviewed. ASSESSMENT and PLAN Diagnoses and all orders for this visit: 
 
1. Hypothyroidism in adult 
-     TSH AND FREE T4 
 
2. Microcytic hypochromic anemia 
-     CBC WITH AUTOMATED DIFF 3. Prediabetes 
-     HEMOGLOBIN A1C WITH EAG 4. Memory change Discussed underlying depression/stress. Recommended meditation,Yoga, herbal preparation like Brahmi and Shatavari. 5. Vitamin D deficiency 
-     VITAMIN D, 25 HYDROXY 6. Irritable bowel syndrome with diarrhea 
-     clindinium-chlordiazePOXIDE (LIBRAX) 5-2.5 mg per capsule; Take 1 Cap by mouth Before breakfast and dinner for 30 days. Max Daily Amount: 2 Caps. 7. Obesity (BMI 30.0-34.9) 
-     metFORMIN ER (GLUCOPHAGE XR) 500 mg tablet; Take 1 Tab by mouth daily (with dinner). Discussed lifestyle issues and health guidance given Patient was given an after visit summary which includes diagnoses, vital signs, current medications, instructions and references & authorized prescriptions . Results of labs will be conveyed to patient, once available. Pt verbalized instructions I provided and expressed understanding of discussion that was held today. Follow-up Disposition: 
Return in about 3 months (around 5/6/2019) for follow up.

## 2019-02-06 NOTE — PATIENT INSTRUCTIONS
Irritable Bowel Syndrome: Care Instructions Your Care Instructions Irritable bowel syndrome, or IBS, is a problem with the intestines that causes belly pain, bloating, gas, constipation, and diarrhea. The cause of IBS is not well known. IBS can last for many years, but it does not get worse over time or lead to serious disease. Most people can control their symptoms by changing their diet and reducing stress. Follow-up care is a key part of your treatment and safety. Be sure to make and go to all appointments, and call your doctor if you are having problems. It's also a good idea to know your test results and keep a list of the medicines you take. How can you care for yourself at home? · For constipation: 
? Include fruits, vegetables, beans, and whole grains in your diet each day. These foods are high in fiber. ? Drink plenty of fluids, enough so that your urine is light yellow or clear like water. If you have kidney, heart, or liver disease and have to limit fluids, talk with your doctor before you increase the amount of fluids you drink. ? Get some exercise every day. Build up slowly to 30 to 60 minutes a day on 5 or more days of the week. ? Take a fiber supplement, such as Citrucel or Metamucil, every day if needed. Read and follow all instructions on the label. ? Schedule time each day for a bowel movement. Having a daily routine may help. Take your time and do not strain when having a bowel movement. · If you often have diarrhea, limit foods and drinks that make it worse. These are different for each person but may include caffeine (found in coffee, tea, chocolate, and cola drinks), alcohol, fatty foods, gas-producing foods (such as beans, cabbage, and broccoli), some dairy products, and spicy foods. Do not eat candy or gum that contains sorbitol. · Keep a daily diary of what you eat and what symptoms you have. This may help find foods that cause you problems. · Eat slowly. Try to make mealtime relaxing. · Find ways to reduce stress. · Get at least 30 minutes of exercise on most days of the week. Exercise can help reduce tension and prevent constipation. Walking is a good choice. You also may want to do other activities, such as running, swimming, cycling, or playing tennis or team sports. When should you call for help? Call your doctor now or seek immediate medical care if: 
  · Your pain is different than usual or occurs with fever.  
  · You lose weight without trying, or you lose your appetite and you do not know why.  
  · Your symptoms often wake you from sleep.  
  · Your stools are black and tarlike or have streaks of blood.  
 Watch closely for changes in your health, and be sure to contact your doctor if: 
  · Your IBS symptoms get worse or begin to disrupt your day-to-day life.  
  · You become more tired than usual.  
  · Your home treatment stops working. Where can you learn more? Go to http://solitarioreMailstar.info/. Enter S044 in the search box to learn more about \"Irritable Bowel Syndrome: Care Instructions. \" Current as of: March 27, 2018 Content Version: 11.9 © 2596-5403 [employer]. Care instructions adapted under license by [employer] (which disclaims liability or warranty for this information). If you have questions about a medical condition or this instruction, always ask your healthcare professional. [place] [address] any warranty or liability for your use of this information. Diet for Irritable Bowel Syndrome: Care Instructions Your Care Instructions Irritable bowel syndrome, or IBS, is a problem with the intestines. IBS can cause belly pain, bloating, gas, constipation, and diarrhea. Most people can control their symptoms by changing their diet and easing stress. No specific foods cause everyone with IBS to have symptoms.  Doctors don't offer a specific diet to manage symptoms. But many people find that they feel better when they stop eating certain foods. A high-fiber diet may help if you have constipation. Follow-up care is a key part of your treatment and safety. Be sure to make and go to all appointments, and call your doctor if you are having problems. It's also a good idea to know your test results and keep a list of the medicines you take. How can you care for yourself at home? To reduce constipation · Include fruits, vegetables, beans, and whole grains in your diet each day. These foods are high in fiber. Slowly increase the amount of fiber you eat. This helps you avoid a lot of gas. · Drink plenty of fluids, enough so that your urine is light yellow or clear like water. If you have kidney, heart, or liver disease and have to limit fluids, talk with your doctor before you increase the amount of fluids you drink. · Get some exercise every day. Build up slowly to 30 to 60 minutes a day on 5 or more days of the week. · Take a fiber supplement, such as Citrucel or Metamucil, every day if needed. Read and follow all instructions on the label. · Schedule time each day for a bowel movement. Having a daily routine may help. Take your time and do not strain when having a bowel movement. · Check with your doctor before you increase the amount of fiber in your diet. For some people who have IBS, eating more fiber may make some symptoms worse. This includes bloating. To reduce diarrhea You may try giving up foods or drinks one at a time to see whether symptoms improve. Limit or avoid the following: · Alcohol · Caffeine, which is found in coffee, tea, cola drinks, and chocolate · Nicotine, from smoking or chewing tobacco 
· Gas-producing foods, such as beans, broccoli, cabbage, and apples · Dairy products that contain lactose (milk sugar), such as ice cream, milk, cheese, and sour cream 
 · Foods and drinks high in sugar, especially fruit juice, soda, candy, and other packaged sweets (such as cookies) · Foods high in fat, including villalba, sausage, butter, oils, and anything deep-fried · Sorbitol and xylitol, artificial sweeteners found in some sugarless candies and chewing gum Keep track of foods · Some people with IBS use a daily food diary to keep track of what they eat and whether they have any symptoms after eating certain foods. The diary also can be a good way to record what is going on in your life. · Stress plays a role in IBS. So if you are aware that certain stresses bring on symptoms, you can try to reduce those stresses. Keep mealtimes pleasant · Try to maintain a pleasant environment when you eat. This may reduce stress that can make symptoms likely to occur. · Give yourself plenty of time to eat, rather than eating on the go. Chew your food slowly. Try not to swallow air, which can cause bloating. Where can you learn more? Go to http://solitario-star.info/. Enter F588 in the search box to learn more about \"Diet for Irritable Bowel Syndrome: Care Instructions. \" Current as of: March 28, 2018 Content Version: 11.9 © 5086-9451 [employer]. Care instructions adapted under license by [employer] (which disclaims liability or warranty for this information). If you have questions about a medical condition or this instruction, always ask your healthcare professional. [place] [address] any warranty or liability for your use of this information.

## 2019-02-06 NOTE — PROGRESS NOTES
Chief Complaint Patient presents with  Thyroid Problem Follow up.  concerned about weight management  Other  
  concerned about memory issue and mood 1. Have you been to the ER, urgent care clinic since your last visit? Hospitalized since your last visit? No 
 
2. Have you seen or consulted any other health care providers outside of the [address] [place] [address] since your last visit? Include any pap smears or colon screening.  No

## 2019-02-07 LAB
25(OH)D3+25(OH)D2 SERPL-MCNC: 17.1 NG/ML (ref 30–100)
BASOPHILS # BLD AUTO: 0 X10E3/UL (ref 0–0.2)
BASOPHILS NFR BLD AUTO: 0 %
EOSINOPHIL # BLD AUTO: 0.5 X10E3/UL (ref 0–0.4)
EOSINOPHIL NFR BLD AUTO: 6 %
ERYTHROCYTE [DISTWIDTH] IN BLOOD BY AUTOMATED COUNT: 16.4 % (ref 12.3–15.4)
EST. AVERAGE GLUCOSE BLD GHB EST-MCNC: 117 MG/DL
HBA1C MFR BLD: 5.7 % (ref 4.8–5.6)
HCT VFR BLD AUTO: 32.6 % (ref 34–46.6)
HGB BLD-MCNC: 10.3 G/DL (ref 11.1–15.9)
IMM GRANULOCYTES # BLD AUTO: 0 X10E3/UL (ref 0–0.1)
IMM GRANULOCYTES NFR BLD AUTO: 0 %
LYMPHOCYTES # BLD AUTO: 2.7 X10E3/UL (ref 0.7–3.1)
LYMPHOCYTES NFR BLD AUTO: 35 %
MCH RBC QN AUTO: 20 PG (ref 26.6–33)
MCHC RBC AUTO-ENTMCNC: 31.6 G/DL (ref 31.5–35.7)
MCV RBC AUTO: 63 FL (ref 79–97)
MONOCYTES # BLD AUTO: 0.5 X10E3/UL (ref 0.1–0.9)
MONOCYTES NFR BLD AUTO: 6 %
NEUTROPHILS # BLD AUTO: 4.1 X10E3/UL (ref 1.4–7)
NEUTROPHILS NFR BLD AUTO: 53 %
PLATELET # BLD AUTO: 397 X10E3/UL (ref 150–379)
RBC # BLD AUTO: 5.15 X10E6/UL (ref 3.77–5.28)
T4 FREE SERPL-MCNC: 1.65 NG/DL (ref 0.82–1.77)
TSH SERPL DL<=0.005 MIU/L-ACNC: 2.26 UIU/ML (ref 0.45–4.5)
WBC # BLD AUTO: 7.8 X10E3/UL (ref 3.4–10.8)

## 2019-02-07 NOTE — PROGRESS NOTES
Francine martinez, 
I have reviwed your results. TSH and T4 normal, so to continue on current 75 mcg Vitamin D very low, will send Rx to pharmacy,take one tablet every week CBC shows anemia,from Thalassemia. Stable [facility] and average sugar results normal 
Let me know if any questions 
thanks

## 2019-02-22 ENCOUNTER — TELEPHONE (OUTPATIENT)
Dept: FAMILY MEDICINE CLINIC | Age: 39
End: 2019-02-22

## 2019-02-22 DIAGNOSIS — E55.9 VITAMIN D DEFICIENCY: Primary | ICD-10-CM

## 2019-02-22 RX ORDER — ERGOCALCIFEROL 1.25 MG/1
50000 CAPSULE ORAL
Qty: 4 CAP | Refills: 5 | Status: SHIPPED | OUTPATIENT
Start: 2019-02-22 | End: 2019-08-31 | Stop reason: SDUPTHER

## 2019-02-22 NOTE — TELEPHONE ENCOUNTER
----- Message from Silvia Martinez sent at 2/22/2019 10:57 AM EST -----  Regarding: Lisa/Telephone  Please call patient at [phone] in regards to her medications.

## 2019-02-22 NOTE — TELEPHONE ENCOUNTER
Outbound call to patient. Name and  verified. Patient states per lab results provider wants her to start on high dose Vitamin D. Medication was not sent to the pharmacy.  Can you please send rx

## 2019-03-05 RX ORDER — LEVOTHYROXINE SODIUM 75 UG/1
75 TABLET ORAL
Qty: 90 TAB | Refills: 0 | Status: SHIPPED | OUTPATIENT
Start: 2019-03-05 | End: 2019-04-19 | Stop reason: SDUPTHER

## 2019-03-05 NOTE — TELEPHONE ENCOUNTER
----- Message from Lucas Almendarez sent at 3/5/2019 10:33 AM EST -----  Regarding: Dr. Mell Benz  Pt is requesting a refill Rx Levothyroxin 75mg at [facility] [phone]). Best contact is [phone]. Cindy Benitez Requested Prescriptions     Pending Prescriptions Disp Refills    levothyroxine (SYNTHROID) 75 mcg tablet       Sig: Take  by mouth Daily (before breakfast).      Last refill : 2/6/2019

## 2019-04-19 RX ORDER — LEVOTHYROXINE SODIUM 75 UG/1
75 TABLET ORAL
Qty: 90 TAB | Refills: 0 | Status: SHIPPED | OUTPATIENT
Start: 2019-04-19 | End: 2019-08-27 | Stop reason: SDUPTHER

## 2019-04-19 NOTE — TELEPHONE ENCOUNTER
Pt. called in today requesting a 90-days supply refill on the following meds. Pharm on file verified. PT. IS COMPLETELY OUT OF MEDS. LOV 02/06/2019  Last refill. 03/05/2019    Requested Prescriptions     Pending Prescriptions Disp Refills    levothyroxine (SYNTHROID) 75 mcg tablet 90 Tab 0     Sig: Take 1 Tab by mouth Daily (before breakfast).

## 2019-04-19 NOTE — TELEPHONE ENCOUNTER
Outbound call to patient name and  verified. Advised patient that medication was sent to the pharmacy on 3/5/19 for a 90 day supply. She stated that she didn't get that and asked what pharmacy it went to. Advised that it was sent to the [facility] on [address] she stated that is the wrong pharmacy. Pharmacy on file is correct. [facility] on [address] [facility]. Patient stated that she takes 75 mcg Monday -Friday and 50 mcg on the weekends. Advised patient that per last note she should be on 75 mcg daily. Please clarify and send to pharmacy.  Did advise patient that provider was out of the office

## 2019-06-10 RX ORDER — LEVOTHYROXINE SODIUM 75 UG/1
75 TABLET ORAL
Qty: 90 TAB | Refills: 0 | Status: CANCELLED | OUTPATIENT
Start: 2019-06-10 | End: 2019-09-08

## 2019-06-10 RX ORDER — LEVOTHYROXINE SODIUM 50 UG/1
50 TABLET ORAL
Qty: 30 TAB | Refills: 1 | Status: SHIPPED | OUTPATIENT
Start: 2019-06-13 | End: 2020-01-03

## 2019-06-10 NOTE — TELEPHONE ENCOUNTER
----- Message from Tanya Tanner sent at 6/10/2019 11:18 AM EDT -----  Regarding: Dr. Freddy Proctor  Patient is checking the status of her medicine refill request for Levothyroxine and also wants to know when she can come in for a thyroid check. Her number is [phone]. ..  Requested Prescriptions     Pending Prescriptions Disp Refills    levothyroxine (SYNTHROID) 75 mcg tablet 90 Tab 0     Sig: Take 1 Tab by mouth Daily (before breakfast) for 90 days.

## 2019-06-10 NOTE — TELEPHONE ENCOUNTER
Outbound call to patient. Name and  verified. Advised patient that medication was sent in 19 for a 90 day supply. She stated that she is on 75 mcg Monday through Friday and 50 mcg on  and . States she needs a refill on the 50g. medication is not on patients list. Advised I would send a message to the provider.

## 2019-06-19 ENCOUNTER — OFFICE VISIT (OUTPATIENT)
Dept: FAMILY MEDICINE CLINIC | Age: 39
End: 2019-06-19

## 2019-06-19 VITALS
SYSTOLIC BLOOD PRESSURE: 108 MMHG | BODY MASS INDEX: 31.04 KG/M2 | TEMPERATURE: 97.8 F | OXYGEN SATURATION: 99 % | RESPIRATION RATE: 12 BRPM | HEIGHT: 59 IN | DIASTOLIC BLOOD PRESSURE: 72 MMHG | HEART RATE: 74 BPM | WEIGHT: 154 LBS

## 2019-06-19 DIAGNOSIS — K58.0 IRRITABLE BOWEL SYNDROME WITH DIARRHEA: ICD-10-CM

## 2019-06-19 DIAGNOSIS — E03.9 HYPOTHYROIDISM IN ADULT: Primary | ICD-10-CM

## 2019-06-19 DIAGNOSIS — E55.9 VITAMIN D DEFICIENCY: ICD-10-CM

## 2019-06-19 RX ORDER — OMEPRAZOLE 40 MG/1
40 CAPSULE, DELAYED RELEASE ORAL
Qty: 30 CAP | Refills: 0 | Status: SHIPPED | OUTPATIENT
Start: 2019-06-19 | End: 2019-12-05 | Stop reason: ALTCHOICE

## 2019-06-19 NOTE — PROGRESS NOTES
HISTORY OF PRESENT ILLNESS  Jennifer Silveira is a 42 y.o. female. Seen for follow up on hypothyroid, IBS and mood changes  HPI  Endocrine Review  Patient is seen for followup of hypothyroidism and prediabetes.  Since last visit: synthroid dose was decreased  She reports medication compliance: all the time and is taking separate from all her other meds.  She reports the following concerns/problems/med side effects: none.  Lab review: labs reviewed and discussed with patient.  On Synthroid 75 mcg 5 days a week and 50 mcg on sat/Sun  Lab Results   Component Value Date/Time    TSH 2.260 02/06/2019 04:28 PM    T4, Free 1.65 02/06/2019 04:28 PM     Irritable Bowel Syndrome  Patient complains of irritable bowel syndrome, abdominal cramping. Onset of symptoms was several months ago. She describes symptoms as crampy abdominal pain: mild: location: in the lower abdomen  loose stools occurring 3 times per day  short transit time  anxiety, currently mild  depression, currently mild. Patient denies weight loss, melena, hematochezia, fever, abnormal vaginal bleeding, pelvic pain, urinary symptoms, changes in bowel movements, dizziness or lightheaded feeling. Course to date has been symptoms have progressed to a point and plateaued. . Previous visits for this problem: none. Evaluation to date has been patient had work by previous PCP. Had food allergy test which was normal. US of abdomen showing fatty liver changes and 1 cm size gall stone. was referred to surgeon, and was reassured. she was advised to follow up with GI   Treatment to date has been none.     As per her she forgets things to do. She has problem concentrating on stuffs. She has same concern on last visit in 03/2018 also  Taking care of 10 y/o daughter and 1 y/o son. Her daughter has been diagnosed with ADHD      Review of Systems   Constitutional: Negative for chills, fever and malaise/fatigue. HENT: Negative for congestion, ear pain, sore throat and tinnitus. Eyes: Negative for blurred vision, double vision, pain and discharge. Respiratory: Negative for cough, shortness of breath and wheezing. Cardiovascular: Negative for chest pain, palpitations and leg swelling. Gastrointestinal: Positive for abdominal pain, diarrhea and nausea. Negative for blood in stool, constipation and vomiting. Genitourinary: Negative for dysuria, frequency, hematuria and urgency. Musculoskeletal: Negative for back pain, joint pain and myalgias. Skin: Negative for rash. Neurological: Negative for dizziness, tremors, seizures and headaches. Endo/Heme/Allergies: Negative for polydipsia. Does not bruise/bleed easily. Psychiatric/Behavioral: Negative for depression and substance abuse. The patient is nervous/anxious. Irritability       Physical Exam   Constitutional: She is oriented to person, place, and time. She appears well-developed and well-nourished. HENT:   Head: Normocephalic and atraumatic. Right Ear: External ear normal.   Mouth/Throat: Oropharynx is clear and moist. No oropharyngeal exudate. Eyes: Pupils are equal, round, and reactive to light. Conjunctivae and EOM are normal. No scleral icterus. Neck: Normal range of motion. Neck supple. No JVD present. No thyromegaly present. Cardiovascular: Normal rate, regular rhythm, normal heart sounds and intact distal pulses. No murmur heard. Pulmonary/Chest: Effort normal and breath sounds normal. She has no wheezes. Abdominal: Soft. Bowel sounds are normal. She exhibits no distension and no mass. Musculoskeletal: Normal range of motion. She exhibits no edema or tenderness. Lymphadenopathy:     She has no cervical adenopathy. Neurological: She is alert and oriented to person, place, and time. She has normal reflexes. No cranial nerve deficit. Skin: Skin is warm and dry. No rash noted. She is not diaphoretic. Psychiatric: She has a normal mood and affect.    Nursing note and vitals reviewed. ASSESSMENT and PLAN  Diagnoses and all orders for this visit:    1. Hypothyroidism in adult  -     TSH AND FREE T4    2. Vitamin D deficiency  -     VITAMIN D, 25 HYDROXY    3. Irritable bowel syndrome with diarrhea  -     omeprazole (PRILOSEC) 40 mg capsule; Take 1 Cap by mouth nightly. Discussed lifestyle issues and health guidance given  Patient was given an after visit summary which includes diagnoses, vital signs, current medications, instructions and references & authorized prescriptions . Results of labs will be conveyed to patient, once available. Pt verbalized instructions I provided and expressed understanding of discussion that was held today. Follow-up and Dispositions    · Return if symptoms worsen or fail to improve.

## 2019-06-19 NOTE — PATIENT INSTRUCTIONS
Irritable Bowel Syndrome: Care Instructions  Your Care Instructions  Irritable bowel syndrome, or IBS, is a problem with the intestines that causes belly pain, bloating, gas, constipation, and diarrhea. The cause of IBS is not well known. IBS can last for many years, but it does not get worse over time or lead to serious disease. Most people can control their symptoms by changing their diet and reducing stress. Follow-up care is a key part of your treatment and safety. Be sure to make and go to all appointments, and call your doctor if you are having problems. It's also a good idea to know your test results and keep a list of the medicines you take. How can you care for yourself at home? · For constipation:  ? Include fruits, vegetables, beans, and whole grains in your diet each day. These foods are high in fiber. ? Drink plenty of fluids, enough so that your urine is light yellow or clear like water. If you have kidney, heart, or liver disease and have to limit fluids, talk with your doctor before you increase the amount of fluids you drink. ? Get some exercise every day. Build up slowly to 30 to 60 minutes a day on 5 or more days of the week. ? Take a fiber supplement, such as Citrucel or Metamucil, every day if needed. Read and follow all instructions on the label. ? Schedule time each day for a bowel movement. Having a daily routine may help. Take your time and do not strain when having a bowel movement. · If you often have diarrhea, limit foods and drinks that make it worse. These are different for each person but may include caffeine (found in coffee, tea, chocolate, and cola drinks), alcohol, fatty foods, gas-producing foods (such as beans, cabbage, and broccoli), some dairy products, and spicy foods. Do not eat candy or gum that contains sorbitol. · Keep a daily diary of what you eat and what symptoms you have. This may help find foods that cause you problems. · Eat slowly.  Try to make mealtime relaxing. · Find ways to reduce stress. · Get at least 30 minutes of exercise on most days of the week. Exercise can help reduce tension and prevent constipation. Walking is a good choice. You also may want to do other activities, such as running, swimming, cycling, or playing tennis or team sports. When should you call for help? Call your doctor now or seek immediate medical care if:    · Your pain is different than usual or occurs with fever.     · You lose weight without trying, or you lose your appetite and you do not know why.     · Your symptoms often wake you from sleep.     · Your stools are black and tarlike or have streaks of blood.    Watch closely for changes in your health, and be sure to contact your doctor if:    · Your IBS symptoms get worse or begin to disrupt your day-to-day life.     · You become more tired than usual.     · Your home treatment stops working. Where can you learn more? Go to http://solitario-star.info/. Enter S594 in the search box to learn more about \"Irritable Bowel Syndrome: Care Instructions. \"  Current as of: March 27, 2018  Content Version: 11.9  © 3009-6110 [employer]. Care instructions adapted under license by [employer] (which disclaims liability or warranty for this information). If you have questions about a medical condition or this instruction, always ask your healthcare professional. [address] any warranty or liability for your use of this information.

## 2019-06-19 NOTE — PROGRESS NOTES
Chief Complaint   Patient presents with    Abdominal Pain     Patient was experiencing abdominal pain for two days but states pain has subsided.  Leg Pain     Pain on Left  shin    Agitation     Patient states she's been having moments where she is very angry and agitated. 1. Have you been to the ER, urgent care clinic since your last visit? Hospitalized since your last visit? No    2. Have you seen or consulted any other health care providers outside of the [address] [place] [address] since your last visit? Include any pap smears or colon screening.  No

## 2019-06-20 LAB
25(OH)D3+25(OH)D2 SERPL-MCNC: 33.1 NG/ML (ref 30–100)
T4 FREE SERPL-MCNC: 1.63 NG/DL (ref 0.82–1.77)
TSH SERPL DL<=0.005 MIU/L-ACNC: 1.38 UIU/ML (ref 0.45–4.5)

## 2019-06-20 NOTE — PROGRESS NOTES
Francine martinez,  Your thyroid function and vitamin d levels are normal  Please continue on same dose for Levothyroxine and Vitamin d  Let me know if you have any questions.   thanks    thanks

## 2019-08-31 DIAGNOSIS — E55.9 VITAMIN D DEFICIENCY: ICD-10-CM

## 2019-09-02 RX ORDER — ERGOCALCIFEROL 1.25 MG/1
CAPSULE ORAL
Qty: 4 CAP | Refills: 5 | Status: SHIPPED | OUTPATIENT
Start: 2019-09-02 | End: 2020-02-24

## 2019-10-30 ENCOUNTER — TELEPHONE (OUTPATIENT)
Dept: FAMILY MEDICINE CLINIC | Age: 39
End: 2019-10-30

## 2019-10-30 NOTE — TELEPHONE ENCOUNTER
[facility]# [phone]  LOV 06/19/19    Pt want a call back from the nurse to see when she needs to come back in for her f/u appt and then she has some question she needs to ask.  Did not want to give the information

## 2019-10-30 NOTE — TELEPHONE ENCOUNTER
Outbound call to patient. Name and  verified. Advised patient she should follow up 6 months from last appt.  She declined to schedule follow up at this time

## 2019-12-05 ENCOUNTER — TELEPHONE (OUTPATIENT)
Dept: FAMILY MEDICINE CLINIC | Age: 39
End: 2019-12-05

## 2019-12-05 ENCOUNTER — OFFICE VISIT (OUTPATIENT)
Dept: FAMILY MEDICINE CLINIC | Age: 39
End: 2019-12-05

## 2019-12-05 VITALS
RESPIRATION RATE: 18 BRPM | BODY MASS INDEX: 30.44 KG/M2 | SYSTOLIC BLOOD PRESSURE: 110 MMHG | WEIGHT: 151 LBS | HEIGHT: 59 IN | TEMPERATURE: 97.1 F | DIASTOLIC BLOOD PRESSURE: 71 MMHG | OXYGEN SATURATION: 98 % | HEART RATE: 88 BPM

## 2019-12-05 DIAGNOSIS — D50.9 MICROCYTIC HYPOCHROMIC ANEMIA: ICD-10-CM

## 2019-12-05 DIAGNOSIS — E55.9 VITAMIN D DEFICIENCY: ICD-10-CM

## 2019-12-05 DIAGNOSIS — E03.9 HYPOTHYROIDISM IN ADULT: Primary | ICD-10-CM

## 2019-12-05 NOTE — TELEPHONE ENCOUNTER
----- Message from Sarah Petit sent at 2019 10:02 AM EST -----  Regarding: Melissa Goode  Appointment not available    Caller's first and last name and relationship to patient (if not the patient):      Best contact number: [phone]      Preferred date and time: December      Scheduled appointment date and time: No appt scheduled      Reason for appointment: f/up Thyroid      Details to clarify the request: Pt needs an appt in December for a 6 month f/up for her Thyroid check. Sarah Petit      . Outbound call to , Pt  verified,   Scheduled for   01:40 PM

## 2019-12-05 NOTE — PROGRESS NOTES
HISTORY OF PRESENT ILLNESS  Jennifer Larkin is a 42 y.o. female. seen for follow up on hypothyroid,anemia, vitamin D deficiency  HPI  Endocrine Review  Patient is seen for followup of hypothyroidism,vitamin D deficiency and prediabetes.  Since last visit: synthroid dose was decreased  She reports medication compliance: all the time and is taking separate from all her other meds.  She reports the following concerns/problems/med side effects: none.  Lab review: labs reviewed and discussed with patient.  On Synthroid 75 mcg 5 days a week and 50 mcg on sat/Sun  Lab Results   Component Value Date/Time    TSH 1.380 06/19/2019 01:47 PM    T4, Free 1.63 06/19/2019 01:47 PM     Lab Results   Component Value Date/Time    VITAMIN D, 25-HYDROXY 33.1 06/19/2019 01:47 PM       Anemia:  ANEMIA FIRST NOTED: several years  PERTINENT LABS:  Lab Results   Component Value Date/Time    WBC 7.8 02/06/2019 04:28 PM    HGB 10.3 (L) 02/06/2019 04:28 PM    HCT 32.6 (L) 02/06/2019 04:28 PM    PLATELET 613 (H) 40/93/4170 04:28 PM    MCV 63 (L) 02/06/2019 04:28 PM     Lab Results   Component Value Date/Time    Iron 81 10/16/2017 05:00 PM    TIBC 273 10/16/2017 05:00 PM    Iron % saturation 30 10/16/2017 05:00 PM     Lab Results   Component Value Date/Time    Folate >20.0 03/12/2018 01:40 PM       DESCRIPTION OF Symptoms:fatigue  EVAL TO DATE: Iron Studies  normal  ETIOLOGY KNOWN?: yes and she has Thalassemia trait      Review of Systems   Constitutional: Positive for malaise/fatigue. Negative for chills and fever. HENT: Negative for congestion, ear pain, sore throat and tinnitus. Eyes: Negative for blurred vision, double vision, pain and discharge. Respiratory: Negative for cough, shortness of breath and wheezing. Cardiovascular: Negative for chest pain, palpitations and leg swelling. Gastrointestinal: Negative for abdominal pain, blood in stool, constipation, diarrhea, nausea and vomiting.    Genitourinary: Negative for dysuria, frequency, hematuria and urgency. Musculoskeletal: Negative for back pain, joint pain and myalgias. Skin: Negative for rash. Neurological: Negative for dizziness, tremors, seizures and headaches. Endo/Heme/Allergies: Negative for polydipsia. Does not bruise/bleed easily. Psychiatric/Behavioral: Negative for depression and substance abuse. The patient is not nervous/anxious. Physical Exam  Constitutional:       Appearance: She is well-developed. HENT:      Head: Normocephalic and atraumatic. Right Ear: External ear normal.      Left Ear: External ear normal.      Nose: Nose normal.   Eyes:      General: No scleral icterus. Conjunctiva/sclera: Conjunctivae normal.      Pupils: Pupils are equal, round, and reactive to light. Neck:      Musculoskeletal: Normal range of motion and neck supple. Thyroid: No thyromegaly. Vascular: No JVD. Cardiovascular:      Rate and Rhythm: Normal rate and regular rhythm. Heart sounds: Normal heart sounds. No murmur. No friction rub. No gallop. Pulmonary:      Effort: Pulmonary effort is normal.      Breath sounds: Normal breath sounds. No wheezing or rales. Chest:      Chest wall: No tenderness. Abdominal:      General: Bowel sounds are normal. There is no distension. Palpations: Abdomen is soft. There is no mass. Tenderness: There is no tenderness. Musculoskeletal: Normal range of motion. General: No tenderness. Lymphadenopathy:      Cervical: No cervical adenopathy. Skin:     General: Skin is warm and dry. Findings: No rash. Neurological:      Mental Status: She is alert and oriented to person, place, and time. Cranial Nerves: No cranial nerve deficit. Deep Tendon Reflexes: Reflexes are normal and symmetric. Comments: Sensations normal   Psychiatric:         Behavior: Behavior normal.         Thought Content:  Thought content normal.         Judgment: Judgment normal.         ASSESSMENT and PLAN  Diagnoses and all orders for this visit:    1. Hypothyroidism in adult  -     TSH AND FREE T4    2. Vitamin D deficiency  -     VITAMIN D, 25 HYDROXY    3. Microcytic hypochromic anemia  -     CBC WITH AUTOMATED DIFF      Discussed lifestyle issues and health guidance given  Patient was given an after visit summary which includes diagnoses, vital signs, current medications, instructions and references & authorized prescriptions . Results of labs will be conveyed to patient, once available. Pt verbalized instructions I provided and expressed understanding of discussion that was held today. Follow-up and Dispositions    · Return in about 6 months (around 6/5/2020) for fasting, follow up.

## 2019-12-05 NOTE — PROGRESS NOTES
Chief Complaint   Patient presents with    Thyroid Problem     Follow up.  Fatigue     Patient has been experiencing Fatigue off and on.     1. Have you been to the ER, urgent care clinic since your last visit? Hospitalized since your last visit? No    2. Have you seen or consulted any other health care providers outside of the [address] [place] [address] since your last visit? Include any pap smears or colon screening.  No

## 2019-12-05 NOTE — PATIENT INSTRUCTIONS
Hypothyroidism: Care Instructions Your Care Instructions You have hypothyroidism, which means that your body is not making enough thyroid hormone. This hormone helps your body use energy. If your thyroid level is low, you may feel tired, be constipated, have an increase in your blood pressure, or have dry skin or memory problems. You may also get cold easily, even when it is warm. Women with low thyroid levels may have heavy menstrual periods. A blood test to find your thyroid-stimulating hormone (TSH) level is used to check for hypothyroidism. A high TSH level may mean that you have low thyroid. When your body is not making enough thyroid hormone, TSH levels rise in an effort to make the body produce more. The treatment for hypothyroidism is to take thyroid hormone pills. You should start to feel better in 1 to 2 weeks. But it can take several months to see changes in the TSH level. You will need regular visits with your doctor to make sure you have the right dose of medicine. Most people need treatment for the rest of their lives. You will need to see your doctor regularly to have blood tests and to make sure you are doing well. Follow-up care is a key part of your treatment and safety. Be sure to make and go to all appointments, and call your doctor if you are having problems. It's also a good idea to know your test results and keep a list of the medicines you take. How can you care for yourself at home? · Take your thyroid hormone medicine exactly as prescribed. Call your doctor if you think you are having a problem with your medicine. Most people do not have side effects if they take the right amount of medicine regularly. ? Take the medicine 30 minutes before breakfast, and do not take it with calcium, vitamins, or iron. ? Do not take extra doses of your thyroid medicine. It will not help you get better any faster, and it may cause side effects. ? If you forget to take a dose, do NOT take a double dose of medicine. Take your usual dose the next day. · Tell your doctor about all prescription, herbal, or over-the-counter products you take. · Take care of yourself. Eat a healthy diet, get enough sleep, and get regular exercise. When should you call for help? Call 911 anytime you think you may need emergency care. For example, call if: 
  · You passed out (lost consciousness).  
  · You have severe trouble breathing.  
  · You have a very slow heartbeat (less than 60 beats a minute).  
  · You have a low body temperature (95°F or below).  
 Call your doctor now or seek immediate medical care if: 
  · You feel tired, sluggish, or weak.  
  · You have trouble remembering things or concentrating.  
  · You do not begin to feel better 2 weeks after starting your medicine.  
 Watch closely for changes in your health, and be sure to contact your doctor if you have any problems. Where can you learn more? Go to http://solitario-star.info/. Enter V852 in the search box to learn more about \"Hypothyroidism: Care Instructions. \" Current as of: November 6, 2018 Content Version: 12.2 © 2381-7112 [employer]. Care instructions adapted under license by [employer] (which disclaims liability or warranty for this information). If you have questions about a medical condition or this instruction, always ask your healthcare professional. [address] any warranty or liability for your use of this information.

## 2019-12-06 LAB
25(OH)D3+25(OH)D2 SERPL-MCNC: 31.9 NG/ML (ref 30–100)
BASOPHILS # BLD AUTO: 0 X10E3/UL (ref 0–0.2)
BASOPHILS NFR BLD AUTO: 1 %
EOSINOPHIL # BLD AUTO: 0.2 X10E3/UL (ref 0–0.4)
EOSINOPHIL NFR BLD AUTO: 2 %
ERYTHROCYTE [DISTWIDTH] IN BLOOD BY AUTOMATED COUNT: 15.5 % (ref 12.3–15.4)
HCT VFR BLD AUTO: 36.6 % (ref 34–46.6)
HGB BLD-MCNC: 10.8 G/DL (ref 11.1–15.9)
IMM GRANULOCYTES # BLD AUTO: 0 X10E3/UL (ref 0–0.1)
IMM GRANULOCYTES NFR BLD AUTO: 0 %
LYMPHOCYTES # BLD AUTO: 2.5 X10E3/UL (ref 0.7–3.1)
LYMPHOCYTES NFR BLD AUTO: 29 %
MCH RBC QN AUTO: 19.5 PG (ref 26.6–33)
MCHC RBC AUTO-ENTMCNC: 29.5 G/DL (ref 31.5–35.7)
MCV RBC AUTO: 66 FL (ref 79–97)
MONOCYTES # BLD AUTO: 0.4 X10E3/UL (ref 0.1–0.9)
MONOCYTES NFR BLD AUTO: 5 %
NEUTROPHILS # BLD AUTO: 5.3 X10E3/UL (ref 1.4–7)
NEUTROPHILS NFR BLD AUTO: 63 %
PLATELET # BLD AUTO: 415 X10E3/UL (ref 150–450)
RBC # BLD AUTO: 5.55 X10E6/UL (ref 3.77–5.28)
T4 FREE SERPL-MCNC: 1.38 NG/DL (ref 0.82–1.77)
TSH SERPL DL<=0.005 MIU/L-ACNC: 2.51 UIU/ML (ref 0.45–4.5)
WBC # BLD AUTO: 8.5 X10E3/UL (ref 3.4–10.8)

## 2020-01-03 RX ORDER — LEVOTHYROXINE SODIUM 50 UG/1
TABLET ORAL
Qty: 30 TAB | Refills: 0 | Status: SHIPPED | OUTPATIENT
Start: 2020-01-03 | End: 2020-04-24

## 2020-01-03 RX ORDER — LEVOTHYROXINE SODIUM 75 UG/1
TABLET ORAL
Qty: 90 TAB | Refills: 0 | Status: SHIPPED | OUTPATIENT
Start: 2020-01-03 | End: 2020-04-24

## 2020-02-24 DIAGNOSIS — E55.9 VITAMIN D DEFICIENCY: ICD-10-CM

## 2020-02-24 RX ORDER — ERGOCALCIFEROL 1.25 MG/1
CAPSULE ORAL
Qty: 4 CAP | Refills: 0 | Status: SHIPPED | OUTPATIENT
Start: 2020-02-24 | End: 2020-03-19

## 2020-03-18 DIAGNOSIS — E55.9 VITAMIN D DEFICIENCY: ICD-10-CM

## 2020-03-19 RX ORDER — ERGOCALCIFEROL 1.25 MG/1
CAPSULE ORAL
Qty: 4 CAP | Refills: 0 | Status: SHIPPED | OUTPATIENT
Start: 2020-03-19 | End: 2020-04-22

## 2020-04-21 DIAGNOSIS — E55.9 VITAMIN D DEFICIENCY: ICD-10-CM

## 2020-04-22 RX ORDER — ERGOCALCIFEROL 1.25 MG/1
CAPSULE ORAL
Qty: 4 CAP | Refills: 0 | Status: SHIPPED | OUTPATIENT
Start: 2020-04-22 | End: 2020-05-25

## 2020-04-24 RX ORDER — LEVOTHYROXINE SODIUM 50 UG/1
TABLET ORAL
Qty: 30 TAB | Refills: 0 | Status: SHIPPED | OUTPATIENT
Start: 2020-04-24 | End: 2020-06-22

## 2020-04-24 RX ORDER — LEVOTHYROXINE SODIUM 75 UG/1
TABLET ORAL
Qty: 90 TAB | Refills: 0 | Status: SHIPPED | OUTPATIENT
Start: 2020-04-24 | End: 2020-06-22

## 2020-05-24 DIAGNOSIS — E55.9 VITAMIN D DEFICIENCY: ICD-10-CM

## 2020-05-25 RX ORDER — ERGOCALCIFEROL 1.25 MG/1
CAPSULE ORAL
Qty: 4 CAP | Refills: 0 | Status: SHIPPED | OUTPATIENT
Start: 2020-05-25 | End: 2020-06-22

## 2020-06-22 DIAGNOSIS — E55.9 VITAMIN D DEFICIENCY: ICD-10-CM

## 2020-06-22 RX ORDER — ERGOCALCIFEROL 1.25 MG/1
CAPSULE ORAL
Qty: 4 CAP | Refills: 0 | Status: SHIPPED | OUTPATIENT
Start: 2020-06-22 | End: 2020-07-20

## 2020-06-22 RX ORDER — LEVOTHYROXINE SODIUM 75 UG/1
TABLET ORAL
Qty: 90 TAB | Refills: 0 | Status: SHIPPED | OUTPATIENT
Start: 2020-06-22 | End: 2020-12-21 | Stop reason: SDUPTHER

## 2020-06-22 RX ORDER — LEVOTHYROXINE SODIUM 50 UG/1
TABLET ORAL
Qty: 30 TAB | Refills: 0 | Status: SHIPPED | OUTPATIENT
Start: 2020-06-22 | End: 2022-06-24 | Stop reason: SDUPTHER

## 2020-07-20 DIAGNOSIS — E55.9 VITAMIN D DEFICIENCY: ICD-10-CM

## 2020-07-20 RX ORDER — ERGOCALCIFEROL 1.25 MG/1
CAPSULE ORAL
Qty: 4 CAP | Refills: 0 | Status: SHIPPED | OUTPATIENT
Start: 2020-07-20 | End: 2020-08-17

## 2020-08-17 DIAGNOSIS — E55.9 VITAMIN D DEFICIENCY: ICD-10-CM

## 2020-08-17 RX ORDER — ERGOCALCIFEROL 1.25 MG/1
CAPSULE ORAL
Qty: 4 CAP | Refills: 0 | Status: SHIPPED | OUTPATIENT
Start: 2020-08-17 | End: 2020-09-14

## 2020-09-14 DIAGNOSIS — E55.9 VITAMIN D DEFICIENCY: ICD-10-CM

## 2020-09-14 RX ORDER — ERGOCALCIFEROL 1.25 MG/1
CAPSULE ORAL
Qty: 4 CAP | Refills: 0 | Status: SHIPPED | OUTPATIENT
Start: 2020-09-14 | End: 2020-10-12

## 2020-10-12 DIAGNOSIS — E55.9 VITAMIN D DEFICIENCY: ICD-10-CM

## 2020-10-12 RX ORDER — ERGOCALCIFEROL 1.25 MG/1
CAPSULE ORAL
Qty: 4 CAP | Refills: 0 | Status: SHIPPED | OUTPATIENT
Start: 2020-10-12 | End: 2020-11-09

## 2020-11-09 DIAGNOSIS — E55.9 VITAMIN D DEFICIENCY: ICD-10-CM

## 2020-11-09 RX ORDER — ERGOCALCIFEROL 1.25 MG/1
CAPSULE ORAL
Qty: 4 CAP | Refills: 0 | Status: SHIPPED | OUTPATIENT
Start: 2020-11-09

## 2020-12-07 DIAGNOSIS — E55.9 VITAMIN D DEFICIENCY: ICD-10-CM

## 2020-12-07 RX ORDER — ERGOCALCIFEROL 1.25 MG/1
CAPSULE ORAL
Qty: 4 CAP | Refills: 0 | OUTPATIENT
Start: 2020-12-07

## 2020-12-14 DIAGNOSIS — E55.9 VITAMIN D DEFICIENCY: ICD-10-CM

## 2020-12-14 RX ORDER — ERGOCALCIFEROL 1.25 MG/1
CAPSULE ORAL
Qty: 4 CAP | Refills: 0 | OUTPATIENT
Start: 2020-12-14

## 2020-12-21 ENCOUNTER — TELEPHONE (OUTPATIENT)
Dept: FAMILY MEDICINE CLINIC | Age: 40
End: 2020-12-21

## 2020-12-21 DIAGNOSIS — E55.9 VITAMIN D DEFICIENCY: ICD-10-CM

## 2020-12-21 DIAGNOSIS — E03.9 HYPOTHYROIDISM IN ADULT: Primary | ICD-10-CM

## 2020-12-21 RX ORDER — ERGOCALCIFEROL 1.25 MG/1
50000 CAPSULE ORAL
Qty: 4 CAP | Refills: 0 | OUTPATIENT
Start: 2020-12-21

## 2020-12-21 RX ORDER — LEVOTHYROXINE SODIUM 75 UG/1
TABLET ORAL
Qty: 15 TAB | Refills: 0 | Status: SHIPPED | OUTPATIENT
Start: 2020-12-21

## 2020-12-21 NOTE — TELEPHONE ENCOUNTER
MD Derik Heath,     Patient call per note below stating she is almost out of levothyroxine. Patient due for labs/appt. Added to rx's if appropriate. Thanks, Padilla Gallardo    Last Visit: 12/5/19  MD Derik Heath  Next Appointment: Not scheduled- Appt/labs due  Previous Refill Encounter(s):   Ergocalciferol 11/9/20 4  Levothyroxine 6/22/20 90    Requested Prescriptions     Pending Prescriptions Disp Refills    ergocalciferol (Vitamin D2) 1,250 mcg (50,000 unit) capsule 4 Cap 0     Sig: Take 1 Cap by mouth every seven (7) days. Appointment/labs due for further refills    levothyroxine (SYNTHROID) 75 mcg tablet 90 Tab 0     Sig: TAKE 1 TABLET BY MOUTH BEFORE BREAKFAST ONCE DAILY FOR 5 DAYS A  WEEK. Appointment/labs due for further refills!

## 2020-12-21 NOTE — TELEPHONE ENCOUNTER
Pt req refill.     .  Requested Prescriptions     Pending Prescriptions Disp Refills    ergocalciferol (Vitamin D2) 1,250 mcg (50,000 unit) capsule 4 Cap 0    levothyroxine (SYNTHROID) 75 mcg tablet 90 Tab 0       [facility]# [phone]

## 2020-12-21 NOTE — TELEPHONE ENCOUNTER
Patient notified via [employer] that an appointment is required for continued care and medication refills.  Katheryn Singh LPN

## 2020-12-22 DIAGNOSIS — E55.9 VITAMIN D DEFICIENCY: ICD-10-CM

## 2020-12-22 DIAGNOSIS — E03.9 HYPOTHYROIDISM IN ADULT: ICD-10-CM

## 2020-12-22 RX ORDER — ERGOCALCIFEROL 1.25 MG/1
CAPSULE ORAL
Qty: 4 CAP | Refills: 0 | OUTPATIENT
Start: 2020-12-22

## 2020-12-22 RX ORDER — LEVOTHYROXINE SODIUM 75 UG/1
TABLET ORAL
Qty: 90 TAB | Refills: 0 | OUTPATIENT
Start: 2020-12-22

## 2020-12-22 NOTE — TELEPHONE ENCOUNTER
----- Message from [place] sent at 12/22/2020  1:54 PM EST -----  Regarding: Dr. Alpa Ramirez Message/Vendor Calls    Caller's first and last name: Britany Hutton      Reason for call: Requesting a call back to discuss med refills for all 3 medications that are on file.         Callback required yes/no and why: yes      Best contact number(s): [phone]      Details to clarify the request: [phone]

## 2021-01-13 RX ORDER — LEVOTHYROXINE SODIUM 50 UG/1
TABLET ORAL
Qty: 30 TAB | Refills: 0 | OUTPATIENT
Start: 2021-01-13

## 2021-01-13 NOTE — TELEPHONE ENCOUNTER
Not seen for more than ayear  Need appointment before any further refills.  Requesting appointment since last 3 months

## 2021-01-20 NOTE — TELEPHONE ENCOUNTER
Patient was sent an [employer] message informing that an appointment is needed for continued care and medication refills. Patient has read request for appointment in 12/2020.   Ayse Holm LPN

## 2021-01-24 RX ORDER — LEVOTHYROXINE SODIUM 50 UG/1
TABLET ORAL
Qty: 30 TAB | Refills: 0 | OUTPATIENT
Start: 2021-01-24

## 2021-01-24 NOTE — TELEPHONE ENCOUNTER
Last appointment more than a year ago. No refills unless patient has an appointment. I am requesting appointment and sending 30 days refills for last 2 months  No further refills.

## 2021-01-25 NOTE — TELEPHONE ENCOUNTER
Contact patient to schedule appointment per MD Jerica Hassan for further refills.   Thanks, Agustin Fleming

## 2021-01-27 NOTE — TELEPHONE ENCOUNTER
Patient was called to schedule an appointment, mailbox full. Patient has been sent several [employer] messages requesting to schedule an appointment for continued care and medication refills, no response.   Seema Simms LPN

## 2021-02-04 RX ORDER — LEVOTHYROXINE SODIUM 50 UG/1
TABLET ORAL
Qty: 30 TAB | Refills: 0 | OUTPATIENT
Start: 2021-02-04

## 2021-02-04 NOTE — TELEPHONE ENCOUNTER
I am refusing refill and requesting appointment for about 2 months.   No refills unless patient has an appointment and blood work

## 2021-02-07 RX ORDER — LEVOTHYROXINE SODIUM 50 UG/1
TABLET ORAL
Qty: 30 TAB | Refills: 0 | OUTPATIENT
Start: 2021-02-07

## 2021-02-07 NOTE — TELEPHONE ENCOUNTER
Patient has not been in office for more than a year.  No refills unless she has an appointment and blood work

## 2021-02-08 NOTE — TELEPHONE ENCOUNTER
Outbound call to patient. Informed that she needs to schedule an appointment but pt. Stated that her insurance plan changed and it can't cover [address]. and her medications are prescribed by another provider.

## 2021-03-12 DIAGNOSIS — E03.9 HYPOTHYROIDISM IN ADULT: ICD-10-CM

## 2021-03-12 RX ORDER — LEVOTHYROXINE SODIUM 75 UG/1
TABLET ORAL
Qty: 90 TAB | Refills: 0 | OUTPATIENT
Start: 2021-03-12

## 2021-10-07 LAB — HBA1C MFR BLD HPLC: 5.7 %

## 2021-12-16 ENCOUNTER — TELEPHONE (OUTPATIENT)
Dept: FAMILY MEDICINE CLINIC | Age: 41
End: 2021-12-16

## 2021-12-16 NOTE — TELEPHONE ENCOUNTER
Pt wants to know if she can be squeezed in for a physical appt or office visit by 1/15/2022.  Please advise

## 2021-12-21 NOTE — TELEPHONE ENCOUNTER
Called, spoke to pt. Two pt identifiers confirmed. Appt schedule OV Follow Up. Pt verbalized understanding of information discussed w/ no further questions at this time.

## 2022-01-12 ENCOUNTER — OFFICE VISIT (OUTPATIENT)
Dept: FAMILY MEDICINE CLINIC | Age: 42
End: 2022-01-12

## 2022-01-12 VITALS
HEIGHT: 59 IN | HEART RATE: 70 BPM | RESPIRATION RATE: 16 BRPM | OXYGEN SATURATION: 98 % | SYSTOLIC BLOOD PRESSURE: 110 MMHG | TEMPERATURE: 97.9 F | BODY MASS INDEX: 28.83 KG/M2 | WEIGHT: 143 LBS | DIASTOLIC BLOOD PRESSURE: 73 MMHG

## 2022-01-12 DIAGNOSIS — R73.03 PREDIABETES: ICD-10-CM

## 2022-01-12 DIAGNOSIS — D56.3 THALASSEMIA MINOR: ICD-10-CM

## 2022-01-12 DIAGNOSIS — E28.2 PCOS (POLYCYSTIC OVARIAN SYNDROME): Primary | ICD-10-CM

## 2022-01-12 DIAGNOSIS — E55.9 VITAMIN D DEFICIENCY: ICD-10-CM

## 2022-01-12 DIAGNOSIS — E34.9 INCREASED PTH LEVEL: ICD-10-CM

## 2022-01-12 DIAGNOSIS — E53.8 VITAMIN B12 DEFICIENCY: ICD-10-CM

## 2022-01-12 DIAGNOSIS — E03.9 HYPOTHYROIDISM IN ADULT: ICD-10-CM

## 2022-01-12 PROBLEM — R41.3 MEMORY CHANGE: Status: RESOLVED | Noted: 2018-03-12 | Resolved: 2022-01-12

## 2022-01-12 PROBLEM — R53.82 CHRONIC FATIGUE: Status: RESOLVED | Noted: 2018-03-12 | Resolved: 2022-01-12

## 2022-01-12 PROCEDURE — 99214 OFFICE O/P EST MOD 30 MIN: CPT | Performed by: FAMILY MEDICINE

## 2022-01-12 RX ORDER — CHOLECALCIFEROL (VITAMIN D3) 10(400)/ML
DROPS ORAL
COMMUNITY
Start: 2021-11-05

## 2022-01-12 RX ORDER — LANOLIN ALCOHOL/MO/W.PET/CERES
1 CREAM (GRAM) TOPICAL
COMMUNITY
Start: 2021-11-05

## 2022-01-12 RX ORDER — METFORMIN HYDROCHLORIDE 500 MG/1
500 TABLET, EXTENDED RELEASE ORAL
Qty: 30 TABLET | Refills: 2 | Status: SHIPPED | OUTPATIENT
Start: 2022-01-12 | End: 2022-06-14

## 2022-01-12 RX ORDER — SPIRONOLACTONE 50 MG/1
TABLET, FILM COATED ORAL
COMMUNITY
Start: 2021-11-15 | End: 2022-01-12 | Stop reason: ALTCHOICE

## 2022-01-12 NOTE — PATIENT INSTRUCTIONS
Polycystic Ovary Syndrome: Care Instructions  Overview  Polycystic ovary syndrome (PCOS) is a hormone imbalance that can affect ovulation. It can cause problems with your periods and make it hard to get pregnant. Doctors don't know for sure what causes PCOS, but it seems to run in families. It also seems to be linked to obesity and a risk for diabetes. You may have other symptoms. These include weight gain, acne, hair growth on the face or body, high blood pressure, and high blood sugar. Your ovaries may have cysts on them. These cysts are growths filled with fluid. Keep in mind that even though you may not have regular periods, you can still get pregnant. Talk to your doctor about birth control if you don't want to get pregnant. Sometimes the hormone changes with PCOS can also make it hard to get pregnant. If this is a concern, talk to your doctor about treatment for this problem. With PCOS, you may go for months or longer with no period. Your doctor may recommend medicines that can help regulate your cycle. Follow-up care is a key part of your treatment and safety. Be sure to make and go to all appointments, and call your doctor if you are having problems. It's also a good idea to know your test results and keep a list of the medicines you take. How can you care for yourself at home? · Take your medicines exactly as prescribed. Call your doctor if you think you're having a problem with your medicine. · Eat a healthy diet. Include vegetables, fruits, beans, and whole grains in your diet each day. · If you're overweight, talk to your doctor about safe ways to lose weight. Losing weight can help with many of the symptoms of PCOS. · Get at least 30 minutes of exercise on most days of the week. Walking is a good choice. Or you can run, swim, cycle, or play team sports. · If you have symptoms that bother you, such as acne and excess hair growth, talk to your doctor about treatment options.  Medicines can help. For unwanted hair growth, some prefer to use home treatments. These can include shaving, waxing, or other methods to remove the hair. · If you're feeling sad or depressed, consider talking to a counselor or to others who have PCOS. It may help. When should you call for help? Call your doctor now or seek immediate medical care if:    · You have severe vaginal bleeding.     · You have new or worse belly or pelvic pain. Watch closely for changes in your health, and be sure to contact your doctor if:    · You do not get better as expected.     · You have unusual vaginal bleeding. Where can you learn more? Go to http://www.gray.com/  Enter K559 in the search box to learn more about \"Polycystic Ovary Syndrome: Care Instructions. \"  Current as of: February 11, 2021               Content Version: 13.0  © 4979-4233 [employer]. Care instructions adapted under license by [employer] (which disclaims liability or warranty for this information). If you have questions about a medical condition or this instruction, always ask your healthcare professional. [address] any warranty or liability for your use of this information.

## 2022-01-12 NOTE — PROGRESS NOTES
HISTORY OF PRESENT ILLNESS  Jennifer Duenas is a 38 y.o. female. Patient was seen today to reestablish care. She has not been in office for almost 2 years and seen another PCP with [facility] primary care, Dr. Nadir Sal.  Patient was seen today to discuss results from blood work, PCOS, hypothyroidism and elevated PTH level. HPI  Endocrine Review  Patient is seen for followup of hypothyroidism and hyperparathyroidism. She reports medication compliance: all the time and is taking separate from all other meds. She reports the following concerns/problems/med side effects: none. Triiodothyronine (T3), Free 2.4   2.0-4.4   PTH, Intact   2021-10-22     PTH, Intact 83   15-65   Vitamin D, 25-Hydroxy   2021-10-22     Vitamin D, 25-Hydroxy 51.7   30.0-100.0   Thyroid Stimulating Hormone (TSH)   2021-10-22     TSH 0.989       Thyroxine (T4) 8.7     She is on levothyroxine 75 MCG 5 days a week and 50 MCG on weekends. She was recommended to stop high-dose vitamin D due to low calcium and elevated PTH. She was having irregular menstrual cycle and had extensive work-up which showed  DHEA-Sulfate 294.0   57.3-279.2   Calcium, Ionized Serum   2021-10-22     Calcium, Ionized, Serum 5.0   4.5-5.6   Hemoglobin A1c (w/ eAG)   2021-10-22     Hemoglobin A1c 5.7   4.8-5.6   Estim. Avg Glu (eAG) 117       Testosterone Free, Profile II   2021-10-22     Albumin 4.5   3.8-4.8   Testosterone, Total, LC/MS 13.4       She was recommended to start spironolactone and birth control pill which she followed up with GYN and now she has Mirena and has not started spironolactone. Patient was found to have very low vitamin B12 and was given intramuscular vitamin B12 for 2 doses  Vitamin B12   2021-10-22     Vitamin B12 228       She is not taking any oral vitamin B12. Review of Systems   Constitutional: Positive for malaise/fatigue. Negative for chills and fever. HENT: Negative for congestion, ear pain, sore throat and tinnitus. Eyes: Negative for blurred vision, double vision, pain and discharge. Respiratory: Negative for cough, shortness of breath and wheezing. Cardiovascular: Negative for chest pain, palpitations and leg swelling. Gastrointestinal: Negative for abdominal pain, blood in stool, constipation, diarrhea, nausea and vomiting. Genitourinary: Negative for dysuria, frequency, hematuria and urgency. Musculoskeletal: Negative for back pain, joint pain and myalgias. Skin: Negative for rash. Neurological: Negative for dizziness, tremors, seizures and headaches. Endo/Heme/Allergies: Negative for polydipsia. Does not bruise/bleed easily. Psychiatric/Behavioral: Negative for depression and substance abuse. The patient is not nervous/anxious. Physical Exam  Vitals and nursing note reviewed. Constitutional:       Appearance: She is well-developed. She is not diaphoretic. HENT:      Head: Normocephalic and atraumatic. Right Ear: External ear normal.      Mouth/Throat:      Pharynx: No oropharyngeal exudate. Eyes:      General: No scleral icterus. Conjunctiva/sclera: Conjunctivae normal.      Pupils: Pupils are equal, round, and reactive to light. Neck:      Thyroid: No thyromegaly. Vascular: No JVD. Cardiovascular:      Rate and Rhythm: Normal rate and regular rhythm. Heart sounds: Normal heart sounds. No murmur heard. Pulmonary:      Effort: Pulmonary effort is normal.      Breath sounds: Normal breath sounds. No wheezing. Abdominal:      General: Bowel sounds are normal. There is no distension. Palpations: Abdomen is soft. There is no mass. Musculoskeletal:         General: No tenderness. Normal range of motion. Cervical back: Normal range of motion and neck supple. Lymphadenopathy:      Cervical: No cervical adenopathy. Skin:     General: Skin is warm and dry. Findings: No rash.    Neurological:      Mental Status: She is alert and oriented to person, place, and time. Cranial Nerves: No cranial nerve deficit. Deep Tendon Reflexes: Reflexes are normal and symmetric. Reflexes normal.         ASSESSMENT and PLAN  Diagnoses and all orders for this visit:    1. PCOS (polycystic ovarian syndrome)  -     metFORMIN ER (GLUCOPHAGE XR) 500 mg tablet; Take 1 Tablet by mouth daily (with breakfast). -     HEMOGLOBIN A1C WITH EAG    2. Vitamin D deficiency    3. Hypothyroidism in adult  -     T4, FREE  -     TSH 3RD GENERATION    4. Vitamin B12 deficiency  -     VITAMIN B12    5. Thalassemia minor  -     CBC WITH AUTOMATED DIFF    6. Prediabetes  -     metFORMIN ER (GLUCOPHAGE XR) 500 mg tablet; Take 1 Tablet by mouth daily (with breakfast). -     HEMOGLOBIN A1C WITH EAG  -     METABOLIC PANEL, COMPREHENSIVE    7. Increased PTH level  -     METABOLIC PANEL, COMPREHENSIVE  -     PTH INTACT    Recommended patient to start low-dose of metformin for PCOS and insulin resistance syndrome. We will do further work-up for elevated parathyroid hormone but most likely secondary hyperparathyroidism due to hypocalcemia. We had extensive discussion today about all the results and pathology of PCOS and hyperparathyroidism. Discussed lifestyle issues and health guidance given  Patient was given an after visit summary which includes diagnoses, vital signs, current medications, instructions and references & authorized prescriptions . Results of labs will be conveyed to patient, once available. Pt verbalized instructions I provided and expressed understanding of discussion that was held today. Follow-up and Dispositions    · Return in about 3 months (around 4/12/2022). Please note that this dictation was completed with [employer], the computer voice recognition software. Quite often unanticipated grammatical, syntax, homophones, and other interpretive errors are inadvertently transcribed by the computer software. Please disregard these errors.   Please excuse any errors that have escaped final proofreading. Thank you.

## 2022-01-12 NOTE — PROGRESS NOTES
Chief Complaint   Patient presents with    Thyroid Problem     follow up         1. \"Have you been to the ER, urgent care clinic since your last visit? Hospitalized since your last visit? \" No    2. \"Have you seen or consulted any other health care providers outside of the [address] [place] [address] since your last visit? \" Yes When: october Where: [facility] Reason for visit: follow up     3. For patients over 45: Has the patient had a colonoscopy? NA based on age or sex     If the patient is female:    3. For patients over 40: Has the patient had a mammogram? No    5. For patients over 21: Has the patient had a pap smear? Yes, but HM not satisfied.  Rooming MA/LPN to request most recent results November        3 most recent PHQ Screens 1/12/2022   Little interest or pleasure in doing things Not at all   Feeling down, depressed, irritable, or hopeless Not at all   Total Score PHQ 2 0       Health Maintenance Due   Topic Date Due    Hepatitis C Screening  Never done    COVID-19 Vaccine (1) Never done    Cervical cancer screen  Never done    A1C test (Diabetic or Prediabetic)  02/06/2020    Flu Vaccine (1) Never done    Lipid Screen  01/12/2022

## 2022-01-29 LAB — SARS-COV-2, NAA: DETECTED

## 2022-03-18 PROBLEM — K58.0 IRRITABLE BOWEL SYNDROME WITH DIARRHEA: Status: ACTIVE | Noted: 2019-02-06

## 2022-06-14 DIAGNOSIS — E28.2 PCOS (POLYCYSTIC OVARIAN SYNDROME): ICD-10-CM

## 2022-06-14 DIAGNOSIS — R73.03 PREDIABETES: ICD-10-CM

## 2022-06-14 RX ORDER — METFORMIN HYDROCHLORIDE 500 MG/1
TABLET, EXTENDED RELEASE ORAL
Qty: 30 TABLET | Refills: 0 | Status: SHIPPED | OUTPATIENT
Start: 2022-06-14 | End: 2022-08-11

## 2022-06-24 RX ORDER — LEVOTHYROXINE SODIUM 50 UG/1
TABLET ORAL
Qty: 24 TABLET | Refills: 0 | Status: SHIPPED | OUTPATIENT
Start: 2022-06-24

## 2022-06-24 NOTE — TELEPHONE ENCOUNTER
MD Heath Bowen,    Request for refill of levothyroxine 50mcg. (previous prescriber:  Chaz Jackson)  Lizette Fuller    Last Visit: 1/12/22 MD Heath Bowen, recent labs not found  Next Appointment: None  Previous Refill Encounter(s): 6/22/20 30    Requested Prescriptions     Pending Prescriptions Disp Refills    levothyroxine (Euthyrox) 50 mcg tablet 24 Tablet 0     Sig: TAKE 1 TABLET BY MOUTH EVERY SATURDAY  [facility] Tracking Only     CPA in place:    Recommendation Provided To:    Intervention Detail: New Rx: 1, reason: Patient Preference   Gap Closed?:    Intervention Accepted By:   Lu Olivas Time Spent (min): 10

## 2022-08-11 DIAGNOSIS — E28.2 PCOS (POLYCYSTIC OVARIAN SYNDROME): ICD-10-CM

## 2022-08-11 DIAGNOSIS — R73.03 PREDIABETES: ICD-10-CM

## 2022-08-11 RX ORDER — METFORMIN HYDROCHLORIDE 500 MG/1
TABLET, EXTENDED RELEASE ORAL
Qty: 30 TABLET | Refills: 0 | Status: SHIPPED | OUTPATIENT
Start: 2022-08-11 | End: 2022-10-06

## 2022-10-06 DIAGNOSIS — E28.2 PCOS (POLYCYSTIC OVARIAN SYNDROME): ICD-10-CM

## 2022-10-06 DIAGNOSIS — R73.03 PREDIABETES: ICD-10-CM

## 2022-10-06 RX ORDER — METFORMIN HYDROCHLORIDE 500 MG/1
TABLET, EXTENDED RELEASE ORAL
Qty: 30 TABLET | Refills: 0 | Status: SHIPPED | OUTPATIENT
Start: 2022-10-06

## 2022-11-07 NOTE — TELEPHONE ENCOUNTER
I am refusing her medicines for last 3 months ,not sure why pharmacy keep sending me 90 days. She is over due for her blood work and an appointment. Let her know and schedule an appointment. [Father] : father

## 2023-02-21 ENCOUNTER — TELEPHONE (OUTPATIENT)
Dept: FAMILY MEDICINE CLINIC | Age: 43
End: 2023-02-21

## 2023-02-21 NOTE — TELEPHONE ENCOUNTER
Patient came into the office requesting an appt with Dr. Nigel Peralta as soon as possible. She states she has been weak and fatigued that has been worsening with time and persisting for several months. She is concerned this may be due to her PCOS. She would also like to address a rash that has been on her face and has not gone away for several months. Dr. Angel Garcia next available is 3/30/23. Patient is requesting her nurse to contact her in regards to this as she would like to be seen by Dr Nigel Peralta earlier if at all possible. Thank you!     SC PAFP 2.21.23

## 2023-02-27 ENCOUNTER — VIRTUAL VISIT (OUTPATIENT)
Dept: FAMILY MEDICINE CLINIC | Age: 43
End: 2023-02-27
Payer: COMMERCIAL

## 2023-02-27 DIAGNOSIS — L71.9 ROSACEA, ACNE: Primary | ICD-10-CM

## 2023-02-27 DIAGNOSIS — K58.0 IRRITABLE BOWEL SYNDROME WITH DIARRHEA: ICD-10-CM

## 2023-02-27 PROCEDURE — 99213 OFFICE O/P EST LOW 20 MIN: CPT | Performed by: FAMILY MEDICINE

## 2023-02-27 RX ORDER — SAME BUTANEDISULFONATE/BETAINE 400-600 MG
250 POWDER IN PACKET (EA) ORAL 2 TIMES DAILY
Qty: 14 CAPSULE | Refills: 0 | Status: SHIPPED | OUTPATIENT
Start: 2023-02-27 | End: 2023-03-06

## 2023-02-27 RX ORDER — METRONIDAZOLE 7.5 MG/G
GEL TOPICAL 2 TIMES DAILY
Qty: 60 G | Refills: 0 | Status: SHIPPED | OUTPATIENT
Start: 2023-02-27

## 2023-02-27 NOTE — PROGRESS NOTES
Yadira Batres is a 41 y.o. female who was seen by synchronous (real-time) audio-video technology on 2/27/2023 for Rash (On face)        Assessment & Plan:   Diagnoses and all orders for this visit:    1. Rosacea, acne  -     metroNIDAZOLE (METROGEL) 0.75 % topical gel; Apply  to affected area two (2) times a day. 2. Irritable bowel syndrome with diarrhea  -     Saccharomyces boulardii (FLORASTOR) 250 mg capsule; Take 1 Capsule by mouth two (2) times a day for 7 days. I spent at least 20 minutes on this visit with this established patient. We discussed differential diagnosis including rosacea versus malar rash associated with autoimmune condition. Started on metronidazole gel. Recommended to avoid triggering events including hot showers, hot food, sun exposure. Subjective:     Patient was evaluated today for rash on both sides of cheeks for almost 2 to 3 months. According to patient, her rash appeared after she had COVID. Rash is located on both sides of the cheeks and patient is also having burning sensation over that area. Rash completely disappears in the morning but with progression of the day, it gets worst after shower, after eating food or going out in some. Prior to Admission medications    Medication Sig Start Date End Date Taking? Authorizing Provider   metroNIDAZOLE (METROGEL) 0.75 % topical gel Apply  to affected area two (2) times a day. 2/27/23  Yes Berenice Salguero MD   Saccharomyces boulardii (FLORASTOR) 250 mg capsule Take 1 Capsule by mouth two (2) times a day for 7 days.  2/27/23 3/6/23 Yes Berenice Salguero MD   metFORMIN ER (GLUCOPHAGE XR) 500 mg tablet Take 1 tablet by mouth once daily with breakfast 10/6/22   Berenice Salguero MD   levothyroxine (Euthyrox) 50 mcg tablet TAKE 1 TABLET BY MOUTH EVERY SATURDAY AND EVERY SUNDAY 6/24/22   Berenice Salguero MD   cholecalciferol, vitamin D3, 10 mcg/mL (400 unit/mL) oral solution 5 ml 11/5/21   Provider, Historical   cyanocobalamin (Vitamin B-12) 1,000 mcg tablet Take 1 Tablet by mouth. 11/5/21   Provider, Historical   levothyroxine (SYNTHROID) 75 mcg tablet TAKE 1 TABLET BY MOUTH BEFORE BREAKFAST ONCE DAILY FOR 5 DAYS A  WEEK. Appointment/labs due for further refills! 12/21/20   Antonia Owens MD   Vitamin D2 1,250 mcg (50,000 unit) capsule Take 1 capsule by mouth once a week  Patient not taking: Reported on 1/12/2022 11/9/20   Antonia Owens MD     Patient Active Problem List    Diagnosis Date Noted    Irritable bowel syndrome with diarrhea 02/06/2019    Thalassemia minor 09/04/2015    Prediabetes 06/27/2014    Spotting 04/15/2014    GERD (gastroesophageal reflux disease) 12/16/2013    Microcytic hypochromic anemia 12/16/2013    Hypothyroidism in adult 10/10/2013    Vitamin D deficiency 02/23/2012     Current Outpatient Medications   Medication Sig Dispense Refill    metroNIDAZOLE (METROGEL) 0.75 % topical gel Apply  to affected area two (2) times a day. 60 g 0    Saccharomyces boulardii (FLORASTOR) 250 mg capsule Take 1 Capsule by mouth two (2) times a day for 7 days. 14 Capsule 0    metFORMIN ER (GLUCOPHAGE XR) 500 mg tablet Take 1 tablet by mouth once daily with breakfast 30 Tablet 0    levothyroxine (Euthyrox) 50 mcg tablet TAKE 1 TABLET BY MOUTH EVERY SATURDAY AND EVERY SUNDAY 24 Tablet 0    cholecalciferol, vitamin D3, 10 mcg/mL (400 unit/mL) oral solution 5 ml      cyanocobalamin (Vitamin B-12) 1,000 mcg tablet Take 1 Tablet by mouth.      levothyroxine (SYNTHROID) 75 mcg tablet TAKE 1 TABLET BY MOUTH BEFORE BREAKFAST ONCE DAILY FOR 5 DAYS A  WEEK. Appointment/labs due for further refills! 15 Tab 0    Vitamin D2 1,250 mcg (50,000 unit) capsule Take 1 capsule by mouth once a week (Patient not taking: Reported on 1/12/2022) 4 Cap 0     No Known Allergies  Past Medical History:   Diagnosis Date    Anemia NEC     Thyroid disease      No past surgical history on file.   Family History   Problem Relation Age of Onset    Diabetes Mother     Hypertension Father      Social History     Tobacco Use    Smoking status: Never    Smokeless tobacco: Never   Substance Use Topics    Alcohol use: No     Alcohol/week: 0.0 standard drinks       Review of Systems   Constitutional:  Negative for chills, fever and malaise/fatigue. HENT:  Negative for congestion, ear pain, sore throat and tinnitus. Eyes:  Negative for blurred vision, double vision, pain and discharge. Respiratory:  Negative for cough, shortness of breath and wheezing. Cardiovascular:  Negative for chest pain, palpitations and leg swelling. Gastrointestinal:  Negative for abdominal pain, blood in stool, constipation, diarrhea, nausea and vomiting. Genitourinary:  Negative for dysuria, frequency, hematuria and urgency. Musculoskeletal:  Negative for back pain, joint pain and myalgias. Skin:  Positive for rash (Bilateral on cheeks). Neurological:  Negative for dizziness, tremors, seizures and headaches. Endo/Heme/Allergies:  Negative for polydipsia. Does not bruise/bleed easily. Psychiatric/Behavioral:  Negative for depression and substance abuse. The patient is not nervous/anxious. Objective:   No flowsheet data found.      [INSTRUCTIONS:  \"[x]\" Indicates a positive item  \"[]\" Indicates a negative item  -- DELETE ALL ITEMS NOT EXAMINED]    Constitutional: [x] Appears well-developed and well-nourished [x] No apparent distress      [] Abnormal -     Mental status: [x] Alert and awake  [x] Oriented to person/place/time [x] Able to follow commands    [] Abnormal -     Eyes:   EOM    [x]  Normal    [] Abnormal -   Sclera  [x]  Normal    [] Abnormal -          Discharge [x]  None visible   [] Abnormal -     HENT: [x] Normocephalic, atraumatic  [] Abnormal -   [x] Mouth/Throat: Mucous membranes are moist    External Ears [x] Normal  [] Abnormal -    Neck: [x] No visualized mass [] Abnormal -     Pulmonary/Chest: [x] Respiratory effort normal   [x] No visualized signs of difficulty breathing or respiratory distress        [] Abnormal -      Musculoskeletal:   [x] Normal gait with no signs of ataxia         [x] Normal range of motion of neck        [] Abnormal -     Neurological:        [x] No Facial Asymmetry (Cranial nerve 7 motor function) (limited exam due to video visit)          [x] No gaze palsy        [] Abnormal -          Skin:        [x] No significant exanthematous lesions or discoloration noted on facial skin         [] Abnormal -            Psychiatric:       [x] Normal Affect [] Abnormal -        [x] No Hallucinations    Other pertinent observable physical exam findings:-        We discussed the expected course, resolution and complications of the diagnosis(es) in detail. Medication risks, benefits, costs, interactions, and alternatives were discussed as indicated. I advised her to contact the office if her condition worsens, changes or fails to improve as anticipated. She expressed understanding with the diagnosis(es) and plan. Rc García, was evaluated through a synchronous (real-time) audio-video encounter. The patient (or guardian if applicable) is aware that this is a billable service, which includes applicable co-pays. This Virtual Visit was conducted with patient's (and/or legal guardian's) consent. The visit was conducted pursuant to the emergency declaration under the [facility], [facility] authority and the [employer] General Act. Patient identification was verified, and a caregiver was present when appropriate. The patient was located at: Home: [facility] [address] [place] [address]  The provider was located at: [phone]      This service was provided through telehealth, between patient Ramu urena from home and Ava Gonzalez MD from [facility] I discussed with the patient the nature of our telemedicine visits, that:      I would evaluate the patient and recommend diagnostics and treatments based on my assessment   Our sessions are not being recorded and that personal health information is protected   Our team would provide follow up care in person if/when the patient needs it    Markus Granda MD

## 2023-03-07 ENCOUNTER — OFFICE VISIT (OUTPATIENT)
Dept: FAMILY MEDICINE CLINIC | Age: 43
End: 2023-03-07
Payer: COMMERCIAL

## 2023-03-07 VITALS
OXYGEN SATURATION: 100 % | BODY MASS INDEX: 29.11 KG/M2 | DIASTOLIC BLOOD PRESSURE: 75 MMHG | HEART RATE: 72 BPM | HEIGHT: 59 IN | SYSTOLIC BLOOD PRESSURE: 112 MMHG | TEMPERATURE: 98.2 F | WEIGHT: 144.4 LBS | RESPIRATION RATE: 16 BRPM

## 2023-03-07 DIAGNOSIS — E55.9 VITAMIN D DEFICIENCY: ICD-10-CM

## 2023-03-07 DIAGNOSIS — Z12.31 SCREENING MAMMOGRAM, ENCOUNTER FOR: ICD-10-CM

## 2023-03-07 DIAGNOSIS — E03.9 HYPOTHYROIDISM IN ADULT: ICD-10-CM

## 2023-03-07 DIAGNOSIS — D56.3 THALASSEMIA MINOR: ICD-10-CM

## 2023-03-07 DIAGNOSIS — E53.8 VITAMIN B12 DEFICIENCY: ICD-10-CM

## 2023-03-07 DIAGNOSIS — E34.9 INCREASED PTH LEVEL: ICD-10-CM

## 2023-03-07 DIAGNOSIS — Z00.00 ROUTINE GENERAL MEDICAL EXAMINATION AT A HEALTH CARE FACILITY: Primary | ICD-10-CM

## 2023-03-07 PROBLEM — E28.2 POLYCYSTIC OVARIES: Status: ACTIVE | Noted: 2023-03-07

## 2023-03-07 PROCEDURE — 99396 PREV VISIT EST AGE 40-64: CPT | Performed by: FAMILY MEDICINE

## 2023-03-07 NOTE — PROGRESS NOTES
HISTORY OF PRESENT ILLNESS  Funmilayo Hernandez is a 41 y.o. female. Patient was seen today to reestablish care as well as annual physical checkup. She has history of PCOS, hypothyroidism, extreme mood changes and irritability. She has brought last blood work report from [place] primary care office that was completed in June 2022. All the records were reviewed during office visit with patient. HPI  Health Maintenance  Immunizations:    Influenza: not UTD - she declines. Tetanus: up to date. Gardasil: n/a. Cancer screening:   Cervical: reviewed guidelines, UTD, done by OBGYN, records requested. Breast: Mammogram ordered, reviewed guidelines, reviewed SBE with her    Endocrine Review  Patient is seen for followup of hypothyroidism and hyperparathyroidism. She reports medication compliance: all the time and is taking separate from all other meds. She reports the following concerns/problems/med side effects: none. She is on levothyroxine 75 MCG 5 days a week and 50 MCG on weekends. She was recommended to stop high-dose vitamin D due to low calcium and elevated PTH. She was having irregular menstrual cycle and had extensive work-up which showed  DHEA-Sulfate 294.0   57.3-279.2   Calcium, Ionized Serum   2021-10-22     Calcium, Ionized, Serum 5.0   4.5-5.6   Hemoglobin A1c (w/ eAG)   2021-10-22     Hemoglobin A1c 5.7   4.8-5.6   Estim. Avg Glu (eAG) 117       Testosterone Free, Profile II   2021-10-22     Albumin 4.5   3.8-4.8   Testosterone, Total, LC/MS 13.4          She was recommended to start spironolactone and birth control pill which she followed up with GYN and now she has Mirena and has not started spironolactone. Patient was found to have very low vitamin B12 and was given intramuscular vitamin B12 for 2 doses  She is not taking any oral vitamin B12.        Patient Care Team:  Rojelio Schreiber MD as PCP - General (Family Medicine)  Rojelio Schreiber MD as PCP - [facility] Provider  Georgie Chen MD (Obstetrics & Gynecology)       The following sections were reviewed & updated as appropriate: PMH, PSH, FH, and SH. Review of Systems   Constitutional:  Positive for malaise/fatigue. Negative for chills and fever. HENT:  Negative for congestion, ear pain, sore throat and tinnitus. Eyes:  Negative for blurred vision, double vision, pain and discharge. Respiratory:  Negative for cough, shortness of breath and wheezing. Cardiovascular:  Negative for chest pain, palpitations and leg swelling. Gastrointestinal:  Positive for abdominal pain. Negative for blood in stool, constipation, diarrhea, nausea and vomiting. Genitourinary:  Negative for dysuria, frequency, hematuria and urgency. Musculoskeletal:  Negative for back pain, joint pain and myalgias. Skin:  Negative for rash. Neurological:  Negative for dizziness, tremors, seizures and headaches. Endo/Heme/Allergies:  Negative for polydipsia. Does not bruise/bleed easily. Psychiatric/Behavioral:  Negative for depression and substance abuse. The patient is not nervous/anxious. Excessive moodiness and irritability     Physical Exam  Vitals and nursing note reviewed. Constitutional:       Appearance: She is well-developed. She is not diaphoretic. HENT:      Head: Normocephalic and atraumatic. Right Ear: External ear normal.      Mouth/Throat:      Mouth: Mucous membranes are moist.      Pharynx: No oropharyngeal exudate. Eyes:      General: No scleral icterus. Conjunctiva/sclera: Conjunctivae normal.      Pupils: Pupils are equal, round, and reactive to light. Neck:      Thyroid: No thyromegaly. Vascular: No JVD. Cardiovascular:      Rate and Rhythm: Normal rate and regular rhythm. Heart sounds: Normal heart sounds. No murmur heard. Pulmonary:      Effort: Pulmonary effort is normal.      Breath sounds: Normal breath sounds. No wheezing.    Abdominal:      General: Bowel sounds are normal. There is no distension. Palpations: Abdomen is soft. There is no mass. Musculoskeletal:         General: No tenderness. Normal range of motion. Cervical back: Normal range of motion and neck supple. Lymphadenopathy:      Cervical: No cervical adenopathy. Skin:     General: Skin is warm and dry. Findings: No rash. Neurological:      Mental Status: She is alert and oriented to person, place, and time. Cranial Nerves: No cranial nerve deficit. Deep Tendon Reflexes: Reflexes are normal and symmetric. Reflexes normal.   Psychiatric:         Mood and Affect: Mood normal.         Behavior: Behavior normal.       ASSESSMENT and PLAN  Diagnoses and all orders for this visit:    1. Routine general medical examination at a health care facility  -     CBC WITH AUTOMATED DIFF; Future  -     HEMOGLOBIN A1C WITH EAG; Future  -     LIPID PANEL; Future  -     HEPATITIS C AB; Future  -     METABOLIC PANEL, COMPREHENSIVE; Future  -     TSH 3RD GENERATION; Future  -     T4, FREE; Future  -     URINALYSIS W/ RFLX MICROSCOPIC; Future    2. Hypothyroidism in adult    3. Vitamin D deficiency  -     VITAMIN D, 25 HYDROXY; Future    4. Screening mammogram, encounter for  -     [facility]O MAMMO BI SCREENING INCL CAD; Future    5. Vitamin B12 deficiency  -     VITAMIN B12; Future    6. Thalassemia minor    7. Increased PTH level  -     PTH INTACT; Future  Discussed lifestyle issues and health guidance given  Patient was given an after visit summary which includes diagnoses, vital signs, current medications, instructions and references & authorized prescriptions . Results of labs will be conveyed to patient, once available. Pt verbalized instructions I provided and expressed understanding of discussion that was held today. Please note that this dictation was completed with [employer], the computer voice recognition software.   Quite often unanticipated grammatical, syntax, homophones, and other interpretive errors are inadvertently transcribed by the computer software. Please disregard these errors. Please excuse any errors that have escaped final proofreading. Thank you.

## 2023-03-07 NOTE — PROGRESS NOTES
Chief Complaint   Patient presents with    Complete Physical       1. Have you been to the ER, urgent care clinic since your last visit? Hospitalized since your last visit? No    2. Have you seen or consulted any other health care providers outside of the [address] [place] [address] since your last visit? Include any pap smears or colon screening. No    Abuse Screening Questionnaire 3/7/2023   Do you ever feel afraid of your partner? N   Are you in a relationship with someone who physically or mentally threatens you? N   Is it safe for you to go home?  Y       3 most recent PHQ Screens 3/7/2023   Little interest or pleasure in doing things Not at all   Feeling down, depressed, irritable, or hopeless -   Total Score PHQ 2 -      Food Insecurity: No Food Insecurity    Worried About Running Out of Food in the Last Year: Never true    Ran Out of Food in the Last Year: Never true      Financial Resource Strain: Low Risk     Difficulty of Paying Living Expenses: Not hard at all

## 2023-03-08 LAB
25(OH)D3 SERPL-MCNC: 20.8 NG/ML (ref 30–100)
ALBUMIN SERPL-MCNC: 4.4 G/DL (ref 3.5–5)
ALBUMIN/GLOB SERPL: 1.1 (ref 1.1–2.2)
ALP SERPL-CCNC: 45 U/L (ref 45–117)
ALT SERPL-CCNC: 18 U/L (ref 12–78)
ANION GAP SERPL CALC-SCNC: 4 MMOL/L (ref 5–15)
APPEARANCE UR: ABNORMAL
AST SERPL-CCNC: 19 U/L (ref 15–37)
BACTERIA URNS QL MICRO: ABNORMAL /HPF
BASOPHILS # BLD: 0.1 K/UL (ref 0–0.1)
BASOPHILS NFR BLD: 1 % (ref 0–1)
BILIRUB SERPL-MCNC: 0.6 MG/DL (ref 0.2–1)
BILIRUB UR QL: NEGATIVE
BUN SERPL-MCNC: 15 MG/DL (ref 6–20)
BUN/CREAT SERPL: 14 (ref 12–20)
CALCIUM SERPL-MCNC: 9.6 MG/DL (ref 8.5–10.1)
CALCIUM SERPL-MCNC: 9.9 MG/DL (ref 8.5–10.1)
CHLORIDE SERPL-SCNC: 103 MMOL/L (ref 97–108)
CHOLEST SERPL-MCNC: 206 MG/DL
CO2 SERPL-SCNC: 27 MMOL/L (ref 21–32)
COLOR UR: ABNORMAL
CREAT SERPL-MCNC: 1.11 MG/DL (ref 0.55–1.02)
DIFFERENTIAL METHOD BLD: ABNORMAL
EOSINOPHIL # BLD: 0.3 K/UL (ref 0–0.4)
EOSINOPHIL NFR BLD: 4 % (ref 0–7)
EPITH CASTS URNS QL MICRO: ABNORMAL /LPF
ERYTHROCYTE [DISTWIDTH] IN BLOOD BY AUTOMATED COUNT: 16 % (ref 11.5–14.5)
EST. AVERAGE GLUCOSE BLD GHB EST-MCNC: 114 MG/DL
GLOBULIN SER CALC-MCNC: 4 G/DL (ref 2–4)
GLUCOSE SERPL-MCNC: 91 MG/DL (ref 65–100)
GLUCOSE UR STRIP.AUTO-MCNC: NEGATIVE MG/DL
HBA1C MFR BLD: 5.6 % (ref 4–5.6)
HCT VFR BLD AUTO: 36.3 % (ref 35–47)
HCV AB SERPL QL IA: NONREACTIVE
HDLC SERPL-MCNC: 62 MG/DL
HDLC SERPL: 3.3 (ref 0–5)
HGB BLD-MCNC: 11.1 G/DL (ref 11.5–16)
HGB UR QL STRIP: NEGATIVE
HYALINE CASTS URNS QL MICRO: ABNORMAL /LPF (ref 0–5)
IMM GRANULOCYTES # BLD AUTO: 0 K/UL (ref 0–0.04)
IMM GRANULOCYTES NFR BLD AUTO: 0 % (ref 0–0.5)
KETONES UR QL STRIP.AUTO: NEGATIVE MG/DL
LDLC SERPL CALC-MCNC: 117.8 MG/DL (ref 0–100)
LEUKOCYTE ESTERASE UR QL STRIP.AUTO: ABNORMAL
LYMPHOCYTES # BLD: 1.8 K/UL (ref 0.8–3.5)
LYMPHOCYTES NFR BLD: 28 % (ref 12–49)
MCH RBC QN AUTO: 19.9 PG (ref 26–34)
MCHC RBC AUTO-ENTMCNC: 30.6 G/DL (ref 30–36.5)
MCV RBC AUTO: 65.2 FL (ref 80–99)
MONOCYTES # BLD: 0.3 K/UL (ref 0–1)
MONOCYTES NFR BLD: 5 % (ref 5–13)
NEUTS SEG # BLD: 3.8 K/UL (ref 1.8–8)
NEUTS SEG NFR BLD: 62 % (ref 32–75)
NITRITE UR QL STRIP.AUTO: NEGATIVE
NRBC # BLD: 0 K/UL (ref 0–0.01)
NRBC BLD-RTO: 0 PER 100 WBC
PH UR STRIP: 5.5 (ref 5–8)
PLATELET # BLD AUTO: 336 K/UL (ref 150–400)
POTASSIUM SERPL-SCNC: 4.5 MMOL/L (ref 3.5–5.1)
PROT SERPL-MCNC: 8.4 G/DL (ref 6.4–8.2)
PROT UR STRIP-MCNC: NEGATIVE MG/DL
PTH-INTACT SERPL-MCNC: 53.5 PG/ML (ref 18.4–88)
RBC # BLD AUTO: 5.57 M/UL (ref 3.8–5.2)
RBC #/AREA URNS HPF: ABNORMAL /HPF (ref 0–5)
RBC MORPH BLD: ABNORMAL
SODIUM SERPL-SCNC: 134 MMOL/L (ref 136–145)
SP GR UR REFRACTOMETRY: 1.02 (ref 1–1.03)
T4 FREE SERPL-MCNC: 1.4 NG/DL (ref 0.8–1.5)
TRIGL SERPL-MCNC: 131 MG/DL (ref ?–150)
TSH SERPL DL<=0.05 MIU/L-ACNC: 1.22 UIU/ML (ref 0.36–3.74)
UROBILINOGEN UR QL STRIP.AUTO: 0.2 EU/DL (ref 0.2–1)
VIT B12 SERPL-MCNC: 275 PG/ML (ref 193–986)
VLDLC SERPL CALC-MCNC: 26.2 MG/DL
WBC # BLD AUTO: 6.3 K/UL (ref 3.6–11)
WBC URNS QL MICRO: ABNORMAL /HPF (ref 0–4)

## 2023-03-10 RX ORDER — ERGOCALCIFEROL 1.25 MG/1
CAPSULE ORAL
Qty: 4 CAPSULE | Refills: 5 | Status: SHIPPED | OUTPATIENT
Start: 2023-03-10

## 2023-03-10 NOTE — PROGRESS NOTES
Francine Rodriguez,  I have reviewed your results. CBC, blood count shows low hemoglobin from your thalassemia trait. This time your kidney function is abnormal, as well as total protein is elevated, did both goes up with dehydration. I strongly recommend you to drink at least 64 to 72 ounce of water daily. Vitamin B12 stays on the lower side as well as your vitamin D is also very low. All these can make you very fatigued and tired as well as can give you muscle cramps. I am sending prescription for high-dose vitamin D, please take for 3 to 4 months and then changed to over-the-counter vitamin D3 2000 units daily. Please take over-the-counter vitamin B12 1000 mcg daily and drink at least 64 to 72 ounce of water. Results for calcium and parathyroid intake are very normal.  Thyroid function is very normal, screening for hepatitis C, screening for diabetes, cholesterol profile all are very reassuring. I strongly recommend you to recheck your levels in 4 to 6 months. Let me know if you have any questions, thanks.

## 2023-05-03 RX ORDER — LEVOTHYROXINE SODIUM 50 UG/1
TABLET ORAL
Qty: 24 TABLET | Refills: 0 | Status: SHIPPED | OUTPATIENT
Start: 2023-05-03

## 2023-05-08 ENCOUNTER — NURSE TRIAGE (OUTPATIENT)
Dept: OTHER | Facility: CLINIC | Age: 43
End: 2023-05-08

## 2023-05-08 NOTE — TELEPHONE ENCOUNTER
Location of patient: [address] robledo from [place] at [facility]  with [employer]. Subjective: Caller states tired ,fatigue, body aches,lightheaded    Current Symptoms: lightheadedness is moderate, not enough energy for daily routine fatigue,    Onset: 1 week     Associated Symptoms: NA    Pain Severity: lower back pain chronic    Temperature: Denies    What has been tried:     LMP:  irreg period, spotting  4/17/23Pregnant: No took preg test yest    Recommended disposition: Go to Office Now be seen in next 4 hours     Care advice provided, patient verbalizes understanding; denies any other questions or concerns; instructed to call back for any new or worsening symptoms. T. Code at [facility] calling patient to schedule    Attention Provider: Thank you for allowing me to participate in the care of your patient. The patient was connected to triage in response to information provided to the ECC/PSC. Please do not respond through this encounter as the response is not directed to a shared pool.      Reason for Disposition   MODERATE weakness (i.e., interferes with work, school, normal activities) and cause unknown  (Exceptions: Weakness with acute minor illness, or weakness from poor fluid intake.)    Protocols used: Weakness (Generalized) and Fatigue-ADULT-[place]

## 2023-05-15 ENCOUNTER — TELEPHONE (OUTPATIENT)
Age: 43
End: 2023-05-15

## 2023-05-15 RX ORDER — MECLIZINE HCL 12.5 MG/1
12.5 TABLET ORAL 3 TIMES DAILY PRN
Qty: 15 TABLET | Refills: 0 | Status: SHIPPED | OUTPATIENT
Start: 2023-05-15 | End: 2023-05-25

## 2023-05-15 NOTE — TELEPHONE ENCOUNTER
Spoke to pt on 5.15.23 she was seen at [facility] last week for Dizziness and Fatigue. Pt's scheduled to have a VV Follow-up with the pt on 5.18.23 @ 3:20 PM.However in the time being the pt would like a callback from Ally or her nurse to discuss what she can do to help with the dizziness and fatigue since it has gotten worse since being seen at [facility]? Please call and advise pt's # [phone]

## 2023-05-15 NOTE — TELEPHONE ENCOUNTER
Outbound call to patient. Name and  verified. Informed pt that PCP has reviewed all the records from [facility] including her blood work. Blood work was very reassuring and nothing positive. Rowena Canela She was diagnosed with underlying depression and anxiety giving her fatigue. this can be discussed during upcoming virtual visit about options to treat her depression and anxiety. For dizziness,  prescription has been sent  to pharmacy.

## 2023-05-15 NOTE — TELEPHONE ENCOUNTER
Please let patient know that I have reviewed all the records from [facility] including her blood work. Blood work was very reassuring and nothing positive. Aby Morin She was diagnosed with underlying depression and anxiety giving her fatigue. We can discuss during upcoming virtual visit about options to treat her depression and anxiety. For dizziness, I am sending prescription to pharmacy.   thanks

## 2023-05-18 ENCOUNTER — TELEMEDICINE (OUTPATIENT)
Age: 43
End: 2023-05-18
Payer: COMMERCIAL

## 2023-05-18 DIAGNOSIS — R53.82 CHRONIC FATIGUE: ICD-10-CM

## 2023-05-18 DIAGNOSIS — E53.8 DEFICIENCY OF OTHER SPECIFIED B GROUP VITAMINS: ICD-10-CM

## 2023-05-18 DIAGNOSIS — D56.3 THALASSEMIA MINOR: ICD-10-CM

## 2023-05-18 DIAGNOSIS — R21 MALAR RASH: ICD-10-CM

## 2023-05-18 DIAGNOSIS — E55.9 VITAMIN D DEFICIENCY, UNSPECIFIED: ICD-10-CM

## 2023-05-18 DIAGNOSIS — R42 SEVERE DIZZINESS: Primary | ICD-10-CM

## 2023-05-18 DIAGNOSIS — L71.9 ROSACEA, UNSPECIFIED: ICD-10-CM

## 2023-05-18 PROCEDURE — 99214 OFFICE O/P EST MOD 30 MIN: CPT | Performed by: FAMILY MEDICINE

## 2023-05-18 RX ORDER — LEVOTHYROXINE SODIUM 50 UG/1
TABLET ORAL
COMMUNITY
Start: 2023-05-03

## 2023-05-18 RX ORDER — METRONIDAZOLE 7.5 MG/G
GEL TOPICAL
COMMUNITY
Start: 2023-02-27

## 2023-05-18 RX ORDER — ERGOCALCIFEROL 1.25 MG/1
50000 CAPSULE ORAL WEEKLY
COMMUNITY
Start: 2023-05-03

## 2023-05-18 RX ORDER — HYDROXYZINE 50 MG/1
TABLET, FILM COATED ORAL
COMMUNITY
Start: 2023-05-10

## 2023-05-18 RX ORDER — METFORMIN HYDROCHLORIDE 500 MG/1
TABLET, EXTENDED RELEASE ORAL
COMMUNITY
Start: 2023-05-09

## 2023-05-18 RX ORDER — LEVOTHYROXINE SODIUM 75 UG/1
TABLET ORAL
COMMUNITY
Start: 2023-05-04

## 2023-05-18 ASSESSMENT — ENCOUNTER SYMPTOMS
COUGH: 0
NAUSEA: 0
WHEEZING: 0
SORE THROAT: 0
ABDOMINAL PAIN: 0
CONSTIPATION: 0
SHORTNESS OF BREATH: 0
BACK PAIN: 0
DIARRHEA: 0

## 2023-05-18 NOTE — PROGRESS NOTES
2023    TELEHEALTH EVALUATION         Jim Ma (:  1980) has requested an audio/video evaluation for the following concern(s):    Chief Complaint   Patient presents with    Fatigue    Dizziness    Anxiety       ASSESSMENT & PLAN     1. Severe dizziness  -     MRI BRAIN WO CONTRAST; Future  2. Chronic fatigue  -     MRI BRAIN WO CONTRAST; Future  3. Rosacea, unspecified  -     External Referral To Dermatology  4. Vitamin D deficiency, unspecified  5. Deficiency of other specified B group vitamins  6. Thalassemia minor     Discussed with patient that we will check for MRI brain to rule out any possible pathology including multiple sclerosis. We had a long discussion on possibility of underlying reason for fatigue including vitamin B12 and vitamin D deficiency, chronic anemia as well as her chronic hypothyroid status. Patient had so many questions regarding PCOS. No follow-ups on file. HPI  Patient was seen today for follow-up on recent urgent care visit for fatigue, dizziness. All the records were reviewed during virtual visit today. Urgent care follow Up  Jim Ma is seen for follow up from recent urgent care visit to  [facility]  on 5/10/23. .  We reviewed the active problem list, medication list, allergies, social history, notes from last encounter, lab results, urgent care provider documentation. She presented with fatigue, dizziness, lightheadedness and forgetfulness. She also mentioned that she has crying spells without any reason. At the urgent care patient had blood work that showed normal BMP, urine analysis, vitals,. Her CBC showed borderline low hemoglobin but patient has history of thalassemia minor has history of stable low hemoglobin for several years. She was diagnosed with anxieties and borderline depression and was recommended to follow-up with PCP.   On last office visit patient was found to have low vitamin B12 and vitamin D levels and reports recommended

## 2023-05-19 ENCOUNTER — TELEPHONE (OUTPATIENT)
Age: 43
End: 2023-05-19

## 2023-05-19 ENCOUNTER — NURSE ONLY (OUTPATIENT)
Age: 43
End: 2023-05-19

## 2023-05-19 DIAGNOSIS — R42 SEVERE DIZZINESS: ICD-10-CM

## 2023-05-19 DIAGNOSIS — R53.82 CHRONIC FATIGUE: ICD-10-CM

## 2023-05-19 DIAGNOSIS — R21 MALAR RASH: ICD-10-CM

## 2023-05-19 NOTE — TELEPHONE ENCOUNTER
Call and spoke to patient, two ID confirmed. Writer informed patient of referral information and Scheduling Number for MRI. Also informed patient that I will fax over OV and referral to that practice. Pt verbalized understanding of information discussed w/ no further questions at this time.    Aleta Stagers LETTY

## 2023-05-22 ENCOUNTER — TELEPHONE (OUTPATIENT)
Age: 43
End: 2023-05-22

## 2023-05-22 LAB
ANA SER QL: POSITIVE
CENTROMERE B AB SER-ACNC: <0.2 AI (ref 0–0.9)
CHROMATIN AB SERPL-ACNC: <0.2 AI (ref 0–0.9)
DSDNA AB SER-ACNC: 4 IU/ML (ref 0–9)
ENA JO1 AB SER-ACNC: <0.2 AI (ref 0–0.9)
ENA RNP AB SER-ACNC: 0.7 AI (ref 0–0.9)
ENA SCL70 AB SER-ACNC: <0.2 AI (ref 0–0.9)
ENA SM AB SER-ACNC: 3.1 AI (ref 0–0.9)
ENA SS-A AB SER-ACNC: <0.2 AI (ref 0–0.9)
ENA SS-B AB SER-ACNC: <0.2 AI (ref 0–0.9)
Lab: ABNORMAL

## 2023-05-23 ENCOUNTER — APPOINTMENT (OUTPATIENT)
Facility: HOSPITAL | Age: 43
End: 2023-05-23
Payer: COMMERCIAL

## 2023-05-23 ENCOUNTER — HOSPITAL ENCOUNTER (EMERGENCY)
Facility: HOSPITAL | Age: 43
Discharge: HOME | End: 2023-05-23
Attending: STUDENT IN AN ORGANIZED HEALTH CARE EDUCATION/TRAINING PROGRAM
Payer: COMMERCIAL

## 2023-05-23 VITALS
RESPIRATION RATE: 14 BRPM | SYSTOLIC BLOOD PRESSURE: 126 MMHG | DIASTOLIC BLOOD PRESSURE: 88 MMHG | OXYGEN SATURATION: 99 % | BODY MASS INDEX: 29.61 KG/M2 | HEIGHT: 60 IN | WEIGHT: 150.79 LBS | HEART RATE: 88 BPM | TEMPERATURE: 97.8 F

## 2023-05-23 DIAGNOSIS — M25.512 ACUTE PAIN OF LEFT SHOULDER: Primary | ICD-10-CM

## 2023-05-23 DIAGNOSIS — M54.12 CERVICAL RADICULOPATHY: ICD-10-CM

## 2023-05-23 PROCEDURE — 93005 ELECTROCARDIOGRAM TRACING: CPT | Performed by: STUDENT IN AN ORGANIZED HEALTH CARE EDUCATION/TRAINING PROGRAM

## 2023-05-23 PROCEDURE — 99284 EMERGENCY DEPT VISIT MOD MDM: CPT

## 2023-05-23 PROCEDURE — 73030 X-RAY EXAM OF SHOULDER: CPT

## 2023-05-23 PROCEDURE — 6370000000 HC RX 637 (ALT 250 FOR IP): Performed by: STUDENT IN AN ORGANIZED HEALTH CARE EDUCATION/TRAINING PROGRAM

## 2023-05-23 RX ORDER — KETOROLAC TROMETHAMINE 30 MG/ML
15 INJECTION, SOLUTION INTRAMUSCULAR; INTRAVENOUS ONCE
Status: DISCONTINUED | OUTPATIENT
Start: 2023-05-23 | End: 2023-05-23 | Stop reason: HOSPADM

## 2023-05-23 RX ORDER — LIDOCAINE 4 G/G
2 PATCH TOPICAL
Status: DISCONTINUED | OUTPATIENT
Start: 2023-05-23 | End: 2023-05-23 | Stop reason: HOSPADM

## 2023-05-23 RX ORDER — ACETAMINOPHEN 500 MG
1000 TABLET ORAL
Status: COMPLETED | OUTPATIENT
Start: 2023-05-23 | End: 2023-05-23

## 2023-05-23 RX ADMIN — ACETAMINOPHEN 1000 MG: 500 TABLET ORAL at 11:44

## 2023-05-23 ASSESSMENT — ENCOUNTER SYMPTOMS
EYE DISCHARGE: 0
NAUSEA: 0
SHORTNESS OF BREATH: 0
VOMITING: 0
EYE REDNESS: 0
DIARRHEA: 0
RHINORRHEA: 0

## 2023-05-23 NOTE — ED NOTES
I have reviewed discharge instructions with the patient and her spouse  The patient and her spouse verbalized understanding. Copy of discharge instructions provided. Patient ambulated out of the ED with a steady gait.         Lina Blackburn RN  05/23/23 [id number]

## 2023-05-23 NOTE — ED TRIAGE NOTES
Intermittent L shoulder pain since 5pm yesterday. Pt denies CP or SOB. Pt denies taking medications for pain.

## 2023-05-24 LAB
EKG ATRIAL RATE: 73 BPM
EKG DIAGNOSIS: NORMAL
EKG P AXIS: 51 DEGREES
EKG P-R INTERVAL: 156 MS
EKG Q-T INTERVAL: 338 MS
EKG QRS DURATION: 80 MS
EKG QTC CALCULATION (BAZETT): 372 MS
EKG R AXIS: 26 DEGREES
EKG T AXIS: 22 DEGREES
EKG VENTRICULAR RATE: 73 BPM

## 2023-05-24 PROCEDURE — 93010 ELECTROCARDIOGRAM REPORT: CPT | Performed by: INTERNAL MEDICINE

## 2023-05-25 ENCOUNTER — TELEPHONE (OUTPATIENT)
Age: 43
End: 2023-05-25

## 2023-05-25 DIAGNOSIS — R76.8 POSITIVE ANA (ANTINUCLEAR ANTIBODY): ICD-10-CM

## 2023-05-25 DIAGNOSIS — R76.8 POSITIVE SMITH ANTIBODY: ICD-10-CM

## 2023-05-25 DIAGNOSIS — R42 SEVERE DIZZINESS: Primary | ICD-10-CM

## 2023-05-25 NOTE — TELEPHONE ENCOUNTER
Chloe called from [address] [place] [address] regarding MRI of Kay Knutson without contract her insurance stated it has to be MRI of Brain with and without contrast and if provider want to change that they would need a new order faxed [phone] attention Thad Bhandari call back #[phone]

## 2023-05-26 ENCOUNTER — HOSPITAL ENCOUNTER (OUTPATIENT)
Facility: HOSPITAL | Age: 43
Discharge: HOME | End: 2023-05-26
Attending: FAMILY MEDICINE
Payer: COMMERCIAL

## 2023-05-26 DIAGNOSIS — R76.8 POSITIVE ANA (ANTINUCLEAR ANTIBODY): ICD-10-CM

## 2023-05-26 DIAGNOSIS — R42 SEVERE DIZZINESS: ICD-10-CM

## 2023-05-26 DIAGNOSIS — R76.8 POSITIVE SMITH ANTIBODY: ICD-10-CM

## 2023-05-26 PROCEDURE — A9579 GAD-BASE MR CONTRAST NOS,1ML: HCPCS | Performed by: RADIOLOGY

## 2023-05-26 PROCEDURE — 70553 MRI BRAIN STEM W/O & W/DYE: CPT

## 2023-05-26 PROCEDURE — 6360000004 HC RX CONTRAST MEDICATION: Performed by: RADIOLOGY

## 2023-05-26 RX ADMIN — GADOTERIDOL 13 ML: 279.3 INJECTION, SOLUTION INTRAVENOUS at 19:19

## 2023-05-30 ENCOUNTER — TELEPHONE (OUTPATIENT)
Age: 43
End: 2023-05-30

## 2023-05-31 ENCOUNTER — TELEMEDICINE (OUTPATIENT)
Age: 43
End: 2023-05-31
Payer: COMMERCIAL

## 2023-05-31 DIAGNOSIS — R21 MALAR RASH: ICD-10-CM

## 2023-05-31 DIAGNOSIS — R53.82 CHRONIC FATIGUE: ICD-10-CM

## 2023-05-31 DIAGNOSIS — R76.8 POSITIVE SMITH ANTIBODY: ICD-10-CM

## 2023-05-31 DIAGNOSIS — R76.8 POSITIVE ANA (ANTINUCLEAR ANTIBODY): Primary | ICD-10-CM

## 2023-05-31 DIAGNOSIS — R42 SEVERE DIZZINESS: ICD-10-CM

## 2023-05-31 DIAGNOSIS — L71.9 ROSACEA, UNSPECIFIED: ICD-10-CM

## 2023-05-31 PROCEDURE — 99213 OFFICE O/P EST LOW 20 MIN: CPT | Performed by: FAMILY MEDICINE

## 2023-05-31 ASSESSMENT — ENCOUNTER SYMPTOMS
CHEST TIGHTNESS: 0
COUGH: 0
CONSTIPATION: 0
ABDOMINAL PAIN: 0
SHORTNESS OF BREATH: 0
BACK PAIN: 0
SORE THROAT: 0
DIARRHEA: 0

## 2023-05-31 NOTE — PROGRESS NOTES
2023    TELEHEALTH EVALUATION         Debbie Lazaro (:  1980) has requested an audio/video evaluation for the following concern(s):    Chief Complaint   Patient presents with    Results     MRI         HPI  This virtual appointment was scheduled to discuss recent lab findings as well as MRI results. Patient was evaluated for persistent fatigue, dizziness and memory issues. Patient had blood work for underlying autoimmune condition and rheumatological condition couple of weeks ago and result was positive for  Lab Results   Component Value Date    SARA Positive (A) 2023      Latest Reference Range & Units 23 15:50   NEO Velasquez (SM) Ab 0.0 - 0.9 AI 3.1 (H)   (H): Data is abnormally high    Patient was referred to rheumatology as well as MRI of the brain was ordered. MRI of brain showed nonspecific changes and negative for any major abnormal findings. Patient and  wants to discuss all the results and next plan. MRI showed  IMPRESSION:  Minimal nonspecific foci of increased T2 signal intensity in cerebral white  matter, chronic microvascular change, infectious/inflammatory process, may be of  limited/no clinical significance. No abnormal postcontrast enhancement or  diffusion restriction. No intracranial mass, hemorrhage or evidence of acute infarction. Review of Systems   Constitutional:  Positive for fatigue. Negative for fever. HENT:  Negative for congestion, ear pain and sore throat. Respiratory:  Negative for cough, chest tightness and shortness of breath. Cardiovascular:  Negative for chest pain and palpitations. Gastrointestinal:  Negative for abdominal pain, constipation and diarrhea. Genitourinary:  Negative for dysuria. Musculoskeletal:  Negative for back pain and neck pain. Skin:  Negative for rash. Neurological:  Positive for dizziness. Negative for headaches. Psychiatric/Behavioral:  Negative for behavioral problems.  The patient is not

## 2023-06-06 RX ORDER — METRONIDAZOLE 7.5 MG/G
GEL TOPICAL
Qty: 45 G | Refills: 0 | Status: SHIPPED | OUTPATIENT
Start: 2023-06-06

## 2023-06-21 ENCOUNTER — NURSE TRIAGE (OUTPATIENT)
Dept: OTHER | Facility: CLINIC | Age: 43
End: 2023-06-21

## 2023-06-21 NOTE — TELEPHONE ENCOUNTER
[facility]    Received call from Vir[place] at [facility] with [employer]. Subjective: Caller states \"Fell June 3rd, hurt left knee and advised to call PCP. Pain is worsening\"     Current Symptoms:   Direct fall to left knee onto thin carpet  UCC dx left knee with sprain or soft tissue injury  Mild swelling, bruise has improved. Tender to touch  Abrasion is improving    Onset: June 3rd    Associated Symptoms: reduced activity, walking gingerly on left knee as advised by [facility]  Increased wakefulness due to pain    Pain Severity: 7-9/10 especially when moving or laying in bed    Temperature: Denies    What has been tried: Initially used ice x 1-2 days  Band-Aids and ointment     LMP: NA Pregnant: No    Recommended disposition: See PCP within 3 Days    Care advice provided, patient verbalizes understanding; denies any other questions or concerns; instructed to call back for any new or worsening symptoms. Patient/Caller agrees with recommended disposition; writer provided warm transfer to [place] at [facility] for appointment scheduling    Attention Provider: Thank you for allowing me to participate in the care of your patient. The patient was connected to triage in response to information provided to the ECC/PSC.   Please do not respond through this encounter as the response is not directed to a shared pool  Reason for Disposition   MODERATE pain (e.g., symptoms interfere with work or school, limping) and present > 3 days    Protocols used: Knee Pain-ADULT-[place]

## 2023-06-27 ENCOUNTER — OFFICE VISIT (OUTPATIENT)
Age: 43
End: 2023-06-27
Payer: COMMERCIAL

## 2023-06-27 VITALS
WEIGHT: 151.4 LBS | SYSTOLIC BLOOD PRESSURE: 98 MMHG | TEMPERATURE: 98.3 F | DIASTOLIC BLOOD PRESSURE: 66 MMHG | OXYGEN SATURATION: 98 % | HEIGHT: 60 IN | HEART RATE: 89 BPM | BODY MASS INDEX: 29.72 KG/M2 | RESPIRATION RATE: 16 BRPM

## 2023-06-27 DIAGNOSIS — S83.412A SPRAIN OF MEDIAL COLLATERAL LIGAMENT OF LEFT KNEE, INITIAL ENCOUNTER: Primary | ICD-10-CM

## 2023-06-27 PROCEDURE — 99213 OFFICE O/P EST LOW 20 MIN: CPT | Performed by: FAMILY MEDICINE

## 2023-06-27 SDOH — ECONOMIC STABILITY: FOOD INSECURITY: WITHIN THE PAST 12 MONTHS, YOU WORRIED THAT YOUR FOOD WOULD RUN OUT BEFORE YOU GOT MONEY TO BUY MORE.: NEVER TRUE

## 2023-06-27 SDOH — ECONOMIC STABILITY: FOOD INSECURITY: WITHIN THE PAST 12 MONTHS, THE FOOD YOU BOUGHT JUST DIDN'T LAST AND YOU DIDN'T HAVE MONEY TO GET MORE.: NEVER TRUE

## 2023-06-27 SDOH — ECONOMIC STABILITY: HOUSING INSECURITY
IN THE LAST 12 MONTHS, WAS THERE A TIME WHEN YOU DID NOT HAVE A STEADY PLACE TO SLEEP OR SLEPT IN A SHELTER (INCLUDING NOW)?: NO

## 2023-06-27 SDOH — ECONOMIC STABILITY: INCOME INSECURITY: HOW HARD IS IT FOR YOU TO PAY FOR THE VERY BASICS LIKE FOOD, HOUSING, MEDICAL CARE, AND HEATING?: NOT HARD AT ALL

## 2023-06-27 ASSESSMENT — ENCOUNTER SYMPTOMS
SORE THROAT: 0
BACK PAIN: 0
CHEST TIGHTNESS: 0
SHORTNESS OF BREATH: 0
COUGH: 0
ABDOMINAL PAIN: 0
DIARRHEA: 0
CONSTIPATION: 0

## 2023-07-26 ENCOUNTER — OFFICE VISIT (OUTPATIENT)
Age: 43
End: 2023-07-26
Payer: COMMERCIAL

## 2023-07-26 VITALS
BODY MASS INDEX: 30.51 KG/M2 | DIASTOLIC BLOOD PRESSURE: 76 MMHG | RESPIRATION RATE: 16 BRPM | HEART RATE: 77 BPM | WEIGHT: 155.4 LBS | SYSTOLIC BLOOD PRESSURE: 114 MMHG | OXYGEN SATURATION: 99 % | TEMPERATURE: 98.2 F | HEIGHT: 60 IN

## 2023-07-26 DIAGNOSIS — E03.8 HYPOTHYROIDISM DUE TO HASHIMOTO'S THYROIDITIS: Primary | ICD-10-CM

## 2023-07-26 DIAGNOSIS — E53.8 VITAMIN B12 DEFICIENCY: ICD-10-CM

## 2023-07-26 DIAGNOSIS — L71.9 ROSACEA, ACNE: ICD-10-CM

## 2023-07-26 DIAGNOSIS — E55.9 VITAMIN D DEFICIENCY: ICD-10-CM

## 2023-07-26 DIAGNOSIS — E06.3 HYPOTHYROIDISM DUE TO HASHIMOTO'S THYROIDITIS: Primary | ICD-10-CM

## 2023-07-26 DIAGNOSIS — S83.412D SPRAIN OF MEDIAL COLLATERAL LIGAMENT OF LEFT KNEE, SUBSEQUENT ENCOUNTER: ICD-10-CM

## 2023-07-26 DIAGNOSIS — R53.82 CHRONIC FATIGUE: ICD-10-CM

## 2023-07-26 LAB
25(OH)D3 SERPL-MCNC: 58.6 NG/ML (ref 30–100)
ALBUMIN SERPL-MCNC: 4.1 G/DL (ref 3.5–5)
ALBUMIN/GLOB SERPL: 1.1 (ref 1.1–2.2)
ALP SERPL-CCNC: 40 U/L (ref 45–117)
ALT SERPL-CCNC: 25 U/L (ref 12–78)
ANION GAP SERPL CALC-SCNC: 5 MMOL/L (ref 5–15)
AST SERPL-CCNC: 19 U/L (ref 15–37)
BILIRUB SERPL-MCNC: 0.4 MG/DL (ref 0.2–1)
BUN SERPL-MCNC: 20 MG/DL (ref 6–20)
BUN/CREAT SERPL: 19 (ref 12–20)
CALCIUM SERPL-MCNC: 9.5 MG/DL (ref 8.5–10.1)
CHLORIDE SERPL-SCNC: 105 MMOL/L (ref 97–108)
CO2 SERPL-SCNC: 27 MMOL/L (ref 21–32)
CREAT SERPL-MCNC: 1.05 MG/DL (ref 0.55–1.02)
GLOBULIN SER CALC-MCNC: 3.7 G/DL (ref 2–4)
GLUCOSE SERPL-MCNC: 84 MG/DL (ref 65–100)
POTASSIUM SERPL-SCNC: 4.4 MMOL/L (ref 3.5–5.1)
PROT SERPL-MCNC: 7.8 G/DL (ref 6.4–8.2)
SODIUM SERPL-SCNC: 137 MMOL/L (ref 136–145)
T4 FREE SERPL-MCNC: 1.3 NG/DL (ref 0.8–1.5)
TSH SERPL DL<=0.05 MIU/L-ACNC: 2.74 UIU/ML (ref 0.36–3.74)
VIT B12 SERPL-MCNC: 238 PG/ML (ref 193–986)

## 2023-07-26 PROCEDURE — 99214 OFFICE O/P EST MOD 30 MIN: CPT | Performed by: FAMILY MEDICINE

## 2023-07-26 RX ORDER — SODIUM SULFACETAMIDE AND SULFUR 100; 50 MG/G; MG/G
CREAM TOPICAL
Qty: 30 G | Refills: 0 | Status: SHIPPED | OUTPATIENT
Start: 2023-07-26

## 2023-07-26 ASSESSMENT — ENCOUNTER SYMPTOMS
BACK PAIN: 0
ABDOMINAL PAIN: 0
SORE THROAT: 0
SHORTNESS OF BREATH: 0
CONSTIPATION: 0
DIARRHEA: 0
CHEST TIGHTNESS: 0
COUGH: 0

## 2023-07-26 NOTE — PROGRESS NOTES
Date:7/26/2023        Patient Marialuisa Flores     YOB: 1980     Age:43 y.o. Seen today for   Chief Complaint   Patient presents with    Thyroid Problem     Follow up   Patient still having persistent pain and swelling of left knee. HPI     Endocrine Review  Patient is seen for followup of hypothyroidism and hyperparathyroidism. She reports medication compliance: all the time and is taking separate from all other meds. She reports the following concerns/problems/med side effects: none. She is on levothyroxine 75 MCG 5 days a week and 50 MCG on weekends. She was recommended to stop high-dose vitamin D due to low calcium and elevated PTH. She was recommended to start spironolactone and birth control pill which she followed up with GYN and now she has Mirena and has not started spironolactone. Patient was found to have very low vitamin B12 and was given intramuscular vitamin B12 for 2 doses  She is not taking any oral vitamin B12. Knee Pain  Patient presents with a knee injury involving the left knee. Onset of the symptoms was about 2 months ago. Inciting event: injured while she accidentally slipped out of a chair and twisted her knee in 1 direction . Current symptoms include stiffness and swelling. Pain is aggravated by any weight bearing. Patient has had no prior knee problems. Evaluation to date: plain films: normal. Treatment to date: prescription NSAIDS which are somewhat effective. Knee brace somewhat effective  Patient had an injury on Lety 3. She had mild symptoms and continued with gym activities and dancing and had worsening of neck pain and swelling so she was evaluated at [facility] on Lety 15.   X-ray of knee was normal.  On last office visit she was diagnosed with ligamental sprain and strongly recommended to support knee with brace and follow orthopedic specialist.  As she was traveling, has not followed up with specialist so far    She was also diagnosed

## 2023-07-26 NOTE — PROGRESS NOTES
Chief Complaint   Patient presents with    Thyroid Problem     Follow up         1. \"Have you been to the ER, urgent care clinic since your last visit? Hospitalized since your last visit? \"     no       2. \"Have you seen or consulted any other health care providers outside of the [address] [place] [address] since your last visit? \"       no     3. For patients aged 43-73: Has the patient had a colonoscopy / FIT/ Cologuard? N/A      If the patient is female: 4. For patients aged 43-66: Has the patient had a mammogram within the past 2 years? No      5. For patients aged 21-65: Has the patient had a pap smear?  No      PHQ-9  3/7/2023   Little interest or pleasure in doing things 0   Little interest or pleasure in doing things -   Feeling down, depressed, or hopeless -   PHQ-2 Score -   Total Score PHQ 2 -   PHQ-9 Total Score 0           Financial Resource Strain: Low Risk     Difficulty of Paying Living Expenses: Not hard at all      Food Insecurity: No Food Insecurity    Worried About Running Out of Food in the Last Year: Never true    Ran Out of Food in the Last Year: Never true          Health Maintenance Due   Topic Date Due    COVID-19 Vaccine (1) Never done    Varicella vaccine (1 of 2 - 2-dose childhood series) Never done    HIV screen  Never done

## 2023-07-28 ENCOUNTER — PATIENT MESSAGE (OUTPATIENT)
Age: 43
End: 2023-07-28

## 2023-07-28 NOTE — TELEPHONE ENCOUNTER
From: Maureen Castillo  To: Dr. Miller Blow: 7/28/2023 3:00 PM EDT  Subject: Metformin     Hello   Sorry, I forgot to ask about metformin when the nurse called about the medicine. Should I take metformin or stop it? I forgot how long I had to take metformin.    Thank you

## 2023-08-02 RX ORDER — LEVOTHYROXINE SODIUM 75 UG/1
TABLET ORAL
Qty: 30 TABLET | Refills: 0 | Status: SHIPPED | OUTPATIENT
Start: 2023-08-02

## 2023-08-02 NOTE — TELEPHONE ENCOUNTER
Last appointment: 07/26/2023 MD Andra Wlalace   Next appointment: 01/29/2024 MD Andra Wallace   Previous refill encounter(s):   05/03/2023 Euthryrox #30     For Pharmacy Admin Tracking Only    Program: Medication Refill  Intervention Detail: New Rx: 1, reason: Patient Preference  Time Spent (min): 5      Requested Prescriptions     Pending Prescriptions Disp Refills    EUTHYROX 75 MCG tablet 30 tablet 0     Sig: TAKE 1 TABLET BY MOUTH EVERY MORNING ON AN EMPTY STOMACH, MON-FRI

## 2023-09-18 RX ORDER — LEVOTHYROXINE SODIUM 75 UG/1
TABLET ORAL
Qty: 90 TABLET | Refills: 1 | Status: SHIPPED | OUTPATIENT
Start: 2023-09-18

## 2023-10-12 ENCOUNTER — PATIENT MESSAGE (OUTPATIENT)
Age: 43
End: 2023-10-12

## 2023-10-12 DIAGNOSIS — R90.89 ABNORMAL BRAIN MRI: Primary | ICD-10-CM

## 2023-10-18 NOTE — TELEPHONE ENCOUNTER
From: Paul Reed  To: Dr. Berry Molt: 10/12/2023 12:25 PM EDT  Subject: Rheumatology appointment     Hello I went to rheumatology appointment with Dr Janey Naqvi and she suggested to see neurologist and also did some lab work can you please check and let me know Thank you

## 2023-11-03 ENCOUNTER — PATIENT MESSAGE (OUTPATIENT)
Age: 43
End: 2023-11-03

## 2023-11-03 DIAGNOSIS — S83.412D SPRAIN OF MEDIAL COLLATERAL LIGAMENT OF LEFT KNEE, SUBSEQUENT ENCOUNTER: Primary | ICD-10-CM

## 2023-11-06 NOTE — TELEPHONE ENCOUNTER
From: Carlota Gannon  To: Dr. Stevens Peoples: 11/3/2023 3:59 PM EDT  Subject: referral     Hello   can you please send the referral for Dr Belem Esparza and their fax number is [phone] and it's for both Shea Monreal and Carlota Gannon. so, its two referrals Thank you.

## 2023-11-14 ENCOUNTER — OFFICE VISIT (OUTPATIENT)
Age: 43
End: 2023-11-14
Payer: COMMERCIAL

## 2023-11-14 ENCOUNTER — TELEPHONE (OUTPATIENT)
Age: 43
End: 2023-11-14

## 2023-11-14 VITALS
DIASTOLIC BLOOD PRESSURE: 74 MMHG | HEART RATE: 76 BPM | HEIGHT: 60 IN | SYSTOLIC BLOOD PRESSURE: 112 MMHG | TEMPERATURE: 98.6 F | OXYGEN SATURATION: 100 % | BODY MASS INDEX: 31.18 KG/M2 | RESPIRATION RATE: 12 BRPM | WEIGHT: 158.8 LBS

## 2023-11-14 DIAGNOSIS — M77.11 LATERAL EPICONDYLITIS OF RIGHT ELBOW: Primary | ICD-10-CM

## 2023-11-14 PROCEDURE — 99213 OFFICE O/P EST LOW 20 MIN: CPT | Performed by: FAMILY MEDICINE

## 2023-11-14 RX ORDER — MELOXICAM 15 MG/1
TABLET ORAL
COMMUNITY
Start: 2023-11-08

## 2023-11-14 ASSESSMENT — PATIENT HEALTH QUESTIONNAIRE - PHQ9
1. LITTLE INTEREST OR PLEASURE IN DOING THINGS: 0
SUM OF ALL RESPONSES TO PHQ QUESTIONS 1-9: 0
2. FEELING DOWN, DEPRESSED OR HOPELESS: 0
SUM OF ALL RESPONSES TO PHQ QUESTIONS 1-9: 0
SUM OF ALL RESPONSES TO PHQ QUESTIONS 1-9: 0
SUM OF ALL RESPONSES TO PHQ9 QUESTIONS 1 & 2: 0
SUM OF ALL RESPONSES TO PHQ QUESTIONS 1-9: 0

## 2023-11-14 ASSESSMENT — ENCOUNTER SYMPTOMS
SHORTNESS OF BREATH: 0
CHEST TIGHTNESS: 0
CONSTIPATION: 0
COUGH: 0
SORE THROAT: 0
ABDOMINAL PAIN: 0
BACK PAIN: 0
DIARRHEA: 0

## 2023-11-14 NOTE — PROGRESS NOTES
Date:11/14/2023        Patient Yecenia Banks     YOB: 1980     Age:43 y.o. Seen today for   Chief Complaint   Patient presents with    Elbow Pain     Right elbow pain x 3-4 weeks. Pain is worse especially when using the joint       HPI   Elbow Pain  Patient complains of right elbow pain. Onset of the symptoms was several weeks ago. Inciting event: none known. Current symptoms include: pain radiating to the right upper arm and forearm and swelling. Pain is aggravated by: grasping, lifting heavy objects, supination/pronation as when opening doors. Symptoms have progressed to a point and plateaued. Patient has had no prior elbow problems. Evaluation to date: an Orthopedics consult, patient was evaluated by Dr Eliane Armendariz who recommended tennis elbow brace . Treatment to date: avoidance of offending activity and tennis elbow brace with no improvement in symptoms . She was also given prescription for meloxicam.  Review of Systems   Review of Systems   Constitutional:  Negative for fever. HENT:  Negative for congestion, ear pain and sore throat. Respiratory:  Negative for cough, chest tightness and shortness of breath. Cardiovascular:  Negative for chest pain and palpitations. Gastrointestinal:  Negative for abdominal pain, constipation and diarrhea. Genitourinary:  Negative for dysuria. Musculoskeletal:  Negative for back pain and neck pain. Right elbow pain   Skin:  Negative for rash. Neurological:  Negative for dizziness and headaches. Psychiatric/Behavioral:  Negative for behavioral problems. The patient is not nervous/anxious. Medications     Current Outpatient Medications   Medication Sig Dispense Refill    VITAMIN D PO Take by mouth Taking 2000 units.  1 tablet everyday      EUTHYROX 75 MCG tablet TAKE 1 TABLET BY MOUTH ONCE DAILY IN THE MORNING ON AN EMPTY STOMACH ON MONDAY THROUGH FRIDAY 90 tablet 1    IUD'S IU by IntraUTERine route      cyanocobalamin 1000 MCG

## 2023-11-14 NOTE — TELEPHONE ENCOUNTER
Spoke with pt says she's been experiencing right elbow pain for the past 3-4 weeks and pain gets worse by the day.  Pt was offered next available OV 12/2023 and pt declined , and would like call back Best call back number [phone]

## 2023-11-29 ENCOUNTER — TELEPHONE (OUTPATIENT)
Age: 43
End: 2023-11-29

## 2023-11-29 NOTE — TELEPHONE ENCOUNTER
Pt called requesting a call back says it's pertaining to the referral for PT. [facility] callback number [phone]

## 2023-12-04 ENCOUNTER — TELEPHONE (OUTPATIENT)
Age: 43
End: 2023-12-04

## 2023-12-04 NOTE — TELEPHONE ENCOUNTER
----- Message from Keysha Becerra sent at 12/4/2023 10:49 AM EST -----  Subject: Referral Request    Reason for referral request? Patient is requesting Insurance Referral to   Ortho for tennis elbow. Please advise. Thank you   Provider patient wants to be referred to(if known): Tory Aleman    Provider Phone Number(if known):[phone]    Additional Information for Provider? [employer].  Needs Insurance   Referral. Thank you  ---------------------------------------------------------------------------  --------------  Tracey BERNARDO    [phone]; OK to leave message on voicemail,OK to respond with electronic   message via [employer] portal (only for patients who have registered [employer]   account)  ---------------------------------------------------------------------------  --------------

## 2023-12-26 DIAGNOSIS — S83.412A SPRAIN OF MEDIAL COLLATERAL LIGAMENT OF LEFT KNEE, INITIAL ENCOUNTER: ICD-10-CM

## 2024-01-29 ENCOUNTER — OFFICE VISIT (OUTPATIENT)
Age: 44
End: 2024-01-29
Payer: COMMERCIAL

## 2024-01-29 VITALS
HEART RATE: 74 BPM | DIASTOLIC BLOOD PRESSURE: 68 MMHG | SYSTOLIC BLOOD PRESSURE: 112 MMHG | RESPIRATION RATE: 14 BRPM | OXYGEN SATURATION: 98 % | BODY MASS INDEX: 33.22 KG/M2 | HEIGHT: 60 IN | WEIGHT: 169.2 LBS | TEMPERATURE: 97.8 F

## 2024-01-29 DIAGNOSIS — E53.8 VITAMIN B12 DEFICIENCY: ICD-10-CM

## 2024-01-29 DIAGNOSIS — Z00.00 ENCOUNTER FOR GENERAL ADULT MEDICAL EXAMINATION WITHOUT ABNORMAL FINDINGS: Primary | ICD-10-CM

## 2024-01-29 DIAGNOSIS — Z11.4 SCREENING FOR HIV WITHOUT PRESENCE OF RISK FACTORS: ICD-10-CM

## 2024-01-29 DIAGNOSIS — D56.3 THALASSEMIA MINOR: ICD-10-CM

## 2024-01-29 DIAGNOSIS — M79.7 FIBROMYALGIA SYNDROME: ICD-10-CM

## 2024-01-29 DIAGNOSIS — E55.9 VITAMIN D DEFICIENCY: ICD-10-CM

## 2024-01-29 LAB
ALBUMIN SERPL-MCNC: 4.2 G/DL (ref 3.5–5)
ALBUMIN/GLOB SERPL: 1 (ref 1.1–2.2)
ALP SERPL-CCNC: 50 U/L (ref 45–117)
ALT SERPL-CCNC: 50 U/L (ref 12–78)
ANION GAP SERPL CALC-SCNC: 7 MMOL/L (ref 5–15)
APPEARANCE UR: ABNORMAL
AST SERPL-CCNC: 30 U/L (ref 15–37)
BACTERIA URNS QL MICRO: NEGATIVE /HPF
BILIRUB SERPL-MCNC: 0.7 MG/DL (ref 0.2–1)
BILIRUB UR QL: NEGATIVE
BUN SERPL-MCNC: 18 MG/DL (ref 6–20)
BUN/CREAT SERPL: 19 (ref 12–20)
CALCIUM SERPL-MCNC: 9.2 MG/DL (ref 8.5–10.1)
CHLORIDE SERPL-SCNC: 109 MMOL/L (ref 97–108)
CHOLEST SERPL-MCNC: 216 MG/DL
CO2 SERPL-SCNC: 23 MMOL/L (ref 21–32)
COLOR UR: ABNORMAL
CREAT SERPL-MCNC: 0.96 MG/DL (ref 0.55–1.02)
EPITH CASTS URNS QL MICRO: ABNORMAL /LPF
GLOBULIN SER CALC-MCNC: 4.2 G/DL (ref 2–4)
GLUCOSE SERPL-MCNC: 85 MG/DL (ref 65–100)
GLUCOSE UR STRIP.AUTO-MCNC: NEGATIVE MG/DL
HDLC SERPL-MCNC: 58 MG/DL
HDLC SERPL: 3.7 (ref 0–5)
HGB UR QL STRIP: NEGATIVE
HYALINE CASTS URNS QL MICRO: ABNORMAL /LPF (ref 0–5)
KETONES UR QL STRIP.AUTO: NEGATIVE MG/DL
LDLC SERPL CALC-MCNC: 133.2 MG/DL (ref 0–100)
LEUKOCYTE ESTERASE UR QL STRIP.AUTO: NEGATIVE
NITRITE UR QL STRIP.AUTO: NEGATIVE
PH UR STRIP: 5.5 (ref 5–8)
POTASSIUM SERPL-SCNC: 4 MMOL/L (ref 3.5–5.1)
PROT SERPL-MCNC: 8.4 G/DL (ref 6.4–8.2)
PROT UR STRIP-MCNC: NEGATIVE MG/DL
RBC #/AREA URNS HPF: ABNORMAL /HPF (ref 0–5)
SODIUM SERPL-SCNC: 139 MMOL/L (ref 136–145)
SP GR UR REFRACTOMETRY: 1.02 (ref 1–1.03)
T4 FREE SERPL-MCNC: 1.4 NG/DL (ref 0.8–1.5)
TRIGL SERPL-MCNC: 124 MG/DL
TSH SERPL DL<=0.05 MIU/L-ACNC: 1.92 UIU/ML (ref 0.36–3.74)
UROBILINOGEN UR QL STRIP.AUTO: 0.2 EU/DL (ref 0.2–1)
VIT B12 SERPL-MCNC: 356 PG/ML (ref 193–986)
VLDLC SERPL CALC-MCNC: 24.8 MG/DL
WBC URNS QL MICRO: ABNORMAL /HPF (ref 0–4)

## 2024-01-29 PROCEDURE — 99396 PREV VISIT EST AGE 40-64: CPT | Performed by: FAMILY MEDICINE

## 2024-01-29 ASSESSMENT — PATIENT HEALTH QUESTIONNAIRE - PHQ9
SUM OF ALL RESPONSES TO PHQ QUESTIONS 1-9: 2
SUM OF ALL RESPONSES TO PHQ9 QUESTIONS 1 & 2: 2
2. FEELING DOWN, DEPRESSED OR HOPELESS: 1
1. LITTLE INTEREST OR PLEASURE IN DOING THINGS: 1

## 2024-01-29 NOTE — PROGRESS NOTES
Chief Complaint   Patient presents with    Annual Exam     \"Have you been to the ER, urgent care clinic since your last visit?  Hospitalized since your last visit?\"    NO    “Have you seen or consulted any other health care providers outside of [facility] since your last visit?”    NO                   1/29/2024     9:37 AM   PHQ-9    Little interest or pleasure in doing things 1   Feeling down, depressed, or hopeless 1   PHQ-2 Score 2   PHQ-9 Total Score 2           Financial Resource Strain: Low Risk  (6/27/2023)    Overall Financial Resource Strain (CARDIA)     Difficulty of Paying Living Expenses: Not hard at all      Food Insecurity: Not on file (6/27/2023)          Health Maintenance Due   Topic Date Due    Hepatitis B vaccine (1 of 3 - 3-dose series) Never done    COVID-19 Vaccine (1) Never done    Varicella vaccine (1 of 2 - 2-dose childhood series) Never done    HIV screen  Never done    Flu vaccine (1) Never done

## 2024-01-29 NOTE — PROGRESS NOTES
Date:1/29/2024        Patient Name:Ludivina Robles     YOB: 1980     Age:44 y.o.    Seen today for   Chief Complaint   Patient presents with    Annual Exam   Patient was seen today for annual physical checkup and to update health maintenance.  Her past medical history significant for thalassemia minor, hypothyroidism, vitamin B12 and D deficiency.  She is getting physical therapy for right tennis elbow and left knee strain.  She is still feeling very fatigued and tired and having issues with anger management and focusing.    HPI   Health Maintenance  Immunizations:   COVID: declined  Influenza: declined  Tetanus: up to date  Gardasil: n/a  Pneumonia: n/a  Cancer screening:   Cervical: reviewed guidelines, UTD, done by OBGYN, records requested.    Breast: reviewed guidelines, reviewed SBE with her, UTD    Patient Care Team:  Beryl Ramirez MD as PCP - General  Beryl Ramirez MD as PCP - Empaneled Provider  Della Vasquez MD (Orthopedic Surgery)  Avani Bowman MD (Obstetrics & Gynecology)       The following sections were reviewed & updated as appropriate: PMH, PSH, FH, and SH.     Review of Systems   Review of Systems    Medications     Current Outpatient Medications   Medication Sig Dispense Refill    EUTHYROX 75 MCG tablet TAKE 1 TABLET BY MOUTH ONCE DAILY IN THE MORNING ON AN EMPTY STOMACH ON MONDAY THROUGH FRIDAY 90 tablet 1    IUD'S IU by IntraUTERine route      cyanocobalamin 1000 MCG tablet Take 1 tablet by mouth Pt taking 2000 mcg once a week      vitamin D (ERGOCALCIFEROL) 1.25 MG (31150 UT) CAPS capsule Take 1 capsule by mouth once a week Taking 15,000 once a week      EUTHYROX 50 MCG tablet TAKE 1 TABLET BY MOUTH IN THE MORNING ON AN EMPTY STOMACH ON SATURDAY AND SUNDAY      diclofenac sodium (VOLTAREN) 1 % GEL APPLY 4 GRAMS  TOPICALLY 4 TIMES DAILY (Patient not taking: Reported on 1/29/2024) 100 g 0     No current facility-administered medications for this visit.

## 2024-01-30 LAB
25(OH)D3 SERPL-MCNC: 27.3 NG/ML (ref 30–100)
HIV 1+2 AB+HIV1 P24 AG SERPL QL IA: NONREACTIVE
HIV 1/2 RESULT COMMENT: NORMAL

## 2024-01-31 ENCOUNTER — TELEPHONE (OUTPATIENT)
Age: 44
End: 2024-01-31

## 2024-01-31 NOTE — TELEPHONE ENCOUNTER
----- Message from Karin Raymond sent at 1/31/2024  3:09 PM EST -----  Regarding: Walk In  Hi! This patient needs to come in to redo labs, she's not sure when she can come back and because I can't schedule appts, I told her to come in and asked to be put on the lab schedule when she checks in. New orders are in the system.     Thank you!

## 2024-02-02 DIAGNOSIS — Z00.00 ENCOUNTER FOR GENERAL ADULT MEDICAL EXAMINATION WITHOUT ABNORMAL FINDINGS: ICD-10-CM

## 2024-02-03 LAB
BASOPHILS # BLD: 0.1 K/UL (ref 0–0.1)
BASOPHILS NFR BLD: 1 % (ref 0–1)
DIFFERENTIAL METHOD BLD: ABNORMAL
EOSINOPHIL # BLD: 0.4 K/UL (ref 0–0.4)
EOSINOPHIL NFR BLD: 5 % (ref 0–7)
ERYTHROCYTE [DISTWIDTH] IN BLOOD BY AUTOMATED COUNT: 17.5 % (ref 11.5–14.5)
EST. AVERAGE GLUCOSE BLD GHB EST-MCNC: 114 MG/DL
HBA1C MFR BLD: 5.6 % (ref 4–5.6)
HCT VFR BLD AUTO: 36.2 % (ref 35–47)
HGB BLD-MCNC: 11 G/DL (ref 11.5–16)
IMM GRANULOCYTES # BLD AUTO: 0 K/UL (ref 0–0.04)
IMM GRANULOCYTES NFR BLD AUTO: 0 % (ref 0–0.5)
LYMPHOCYTES # BLD: 2.3 K/UL (ref 0.8–3.5)
LYMPHOCYTES NFR BLD: 33 % (ref 12–49)
MCH RBC QN AUTO: 20.2 PG (ref 26–34)
MCHC RBC AUTO-ENTMCNC: 30.4 G/DL (ref 30–36.5)
MCV RBC AUTO: 66.4 FL (ref 80–99)
MONOCYTES # BLD: 0.6 K/UL (ref 0–1)
MONOCYTES NFR BLD: 8 % (ref 5–13)
NEUTS SEG # BLD: 3.7 K/UL (ref 1.8–8)
NEUTS SEG NFR BLD: 53 % (ref 32–75)
NRBC # BLD: 0 K/UL (ref 0–0.01)
NRBC BLD-RTO: 0 PER 100 WBC
PLATELET # BLD AUTO: 453 K/UL (ref 150–400)
PMV BLD AUTO: 11 FL (ref 8.9–12.9)
RBC # BLD AUTO: 5.45 M/UL (ref 3.8–5.2)
RBC MORPH BLD: ABNORMAL
WBC # BLD AUTO: 7.1 K/UL (ref 3.6–11)

## 2024-02-06 ENCOUNTER — TELEPHONE (OUTPATIENT)
Age: 44
End: 2024-02-06

## 2024-02-06 NOTE — TELEPHONE ENCOUNTER
Patient is stating that the referral from November, the facility is needing an insurance referral before her scheduled appointment or insurance won't cover it.  [facility]: [phone]

## 2024-02-07 NOTE — TELEPHONE ENCOUNTER
Patient went to appt on 11.8.23 and we were NOT aware of the appt on that DOS.  [facility] should have known that the patient needed a Referral/PA and made the patient sign a HMO waiver if she was seen that day.  [employer] doesn't back date but so far and it looks like when we found out about the patient's appt we submitted it through [employer] and the system auto generated 11.14.23-12.31.23 for 12 visits to  with a NO PRECERT REQUIRED          Myesha Osborne  Referral Specialist  [facility]  P: [phone]   F: [phone]

## 2024-02-13 ENCOUNTER — PATIENT MESSAGE (OUTPATIENT)
Age: 44
End: 2024-02-13

## 2024-02-13 DIAGNOSIS — M77.11 RIGHT TENNIS ELBOW: Primary | ICD-10-CM

## 2024-02-13 DIAGNOSIS — M77.12 LEFT TENNIS ELBOW: ICD-10-CM

## 2024-02-13 NOTE — TELEPHONE ENCOUNTER
From: Ludivina Robles  To: Dr. Beryl Ramirez  Sent: 2/13/2024 12:35 PM EST  Subject: Hand pain     Hello   My left hand also started paining n right hand not much improvement, can u please suggest any other orthopedic to see n get opinion   Dr DAHL is not available until next Friday   Please advice

## 2024-02-27 ENCOUNTER — OFFICE VISIT (OUTPATIENT)
Age: 44
End: 2024-02-27
Payer: COMMERCIAL

## 2024-02-27 VITALS
RESPIRATION RATE: 16 BRPM | BODY MASS INDEX: 32.39 KG/M2 | OXYGEN SATURATION: 98 % | SYSTOLIC BLOOD PRESSURE: 114 MMHG | WEIGHT: 165 LBS | HEIGHT: 60 IN | HEART RATE: 83 BPM | TEMPERATURE: 97.3 F | DIASTOLIC BLOOD PRESSURE: 72 MMHG

## 2024-02-27 DIAGNOSIS — G47.12 IDIOPATHIC HYPERSOMNIA WITHOUT LONG SLEEP TIME: ICD-10-CM

## 2024-02-27 DIAGNOSIS — R42 DIZZINESS: Primary | ICD-10-CM

## 2024-02-27 PROCEDURE — 99205 OFFICE O/P NEW HI 60 MIN: CPT | Performed by: PSYCHIATRY & NEUROLOGY

## 2024-02-27 RX ORDER — DICLOFENAC SODIUM 75 MG/1
75 TABLET, DELAYED RELEASE ORAL 2 TIMES DAILY PRN
COMMUNITY
Start: 2024-02-15

## 2024-02-27 ASSESSMENT — PATIENT HEALTH QUESTIONNAIRE - PHQ9
SUM OF ALL RESPONSES TO PHQ QUESTIONS 1-9: 0
1. LITTLE INTEREST OR PLEASURE IN DOING THINGS: 0
2. FEELING DOWN, DEPRESSED OR HOPELESS: 0
SUM OF ALL RESPONSES TO PHQ QUESTIONS 1-9: 0
SUM OF ALL RESPONSES TO PHQ9 QUESTIONS 1 & 2: 0

## 2024-02-27 NOTE — PROGRESS NOTES
Pulse 83   Temp 97.3 °F (36.3 °C) (Temporal)   Resp 16   Ht 1.524 m (5')   Wt 74.8 kg (165 lb)   LMP 12/20/2023 Comment: Pcos  SpO2 98%   BMI 32.22 kg/m²     PHYSICAL EXAM:    General Appearance: Alert, patient appears stated age.   General:  Obese, patient in no apparent distress.   Head/Face: The head is normocephalic and atraumatic.   Eyes: Conjunctivae appear normal. Sclera appear normal and non-icteric.   Nose (and Sinus):   No abnormality of the nose or sinuses is noted.   Oral:   Throat is clear.   Lymphatics:  No lymphadenopathy in the neck/head.   Neck and Thyroid:   No bruits noted in the neck.   Respiratory:  Lungs clear to auscultation.   Cardiovascular:  Palpation and auscultation: regular rate and rhythm.   Extremity: No joint swelling, erythema or pedal edema.      NEUROLOGICAL EXAM:    Appearance:  The patient is obese, provides a coherent history and is in no acute distress.   Mental Status: Oriented to time, place and person. Fluent, no aphasia or dysarthria. Mood and affect appropriate.   Cranial Nerves:   Intact visual fields. VA 20/20 OU on near vision without correction. Fundi are benign. No papilledema. SHANNON, EOM's full, no nystagmus, no ptosis. Facial sensation is normal. Corneal reflexes are intact. Facial movement is symmetric. Hearing is normal bilaterally. Palate is midline with normal elevation. Sternocleidomastoid and trapezius muscles are normal. Tongue is midline.   Motor:  5/5 strength in upper and lower proximal and distal muscles. Normal bulk and tone. No fasciculations. No pronator drift.    Reflexes:   Deep tendon reflexes 1+/4 and symmetrical. Downgoing toes.   Sensory:   Normal to cold, pinprick and vibration.   Gait:  Normal gait. No Romberg. Can do heel, toe and tandem walking.   Tremor:   No tremor noted.   Cerebellar:  Intact FTN/KITA/HTS.   Neurovascular:  Normal heart sounds and regular rhythm, peripheral pulses intact, and no carotid bruits.     Imaging  Brain

## 2024-02-28 RX ORDER — LEVOTHYROXINE SODIUM 75 UG/1
TABLET ORAL
Qty: 90 TABLET | Refills: 0 | Status: SHIPPED | OUTPATIENT
Start: 2024-02-28

## 2024-03-05 ENCOUNTER — TELEPHONE (OUTPATIENT)
Age: 44
End: 2024-03-05

## 2024-03-06 RX ORDER — LEVOTHYROXINE SODIUM 50 UG/1
TABLET ORAL
Qty: 24 TABLET | Refills: 0 | Status: SHIPPED | OUTPATIENT
Start: 2024-03-06

## 2024-03-06 NOTE — TELEPHONE ENCOUNTER
Stacey Randolph sending refill request for the Euthrox 50mcg patient takes on SAT & SUN.      Euthrox 75mcg sent on 2/28/24 for MON-FRI.    Last appointment: 1/29/24 Ashley  Next appointment: 7/29/24 Ashley  Previous refill encounter(s): 5/3/23 24    Requested Prescriptions     Pending Prescriptions Disp Refills    EUTHYROX 50 MCG tablet 24 tablet 0     Sig: Take 1 tablet by mouth in the morning on an empty stomach on SATURDAY AND SUNDAY.     For Pharmacy Admin Tracking Only    Program: Medication Refill  CPA in place:    Recommendation Provided To:   Intervention Detail: New Rx: 1, reason: Patient Preference  Intervention Accepted By:   Gap Closed?:    Time Spent (min): 10

## 2024-03-07 ENCOUNTER — TELEPHONE (OUTPATIENT)
Age: 44
End: 2024-03-07

## 2024-03-07 NOTE — TELEPHONE ENCOUNTER
----- Message from Kierra Kang sent at 3/7/2024  9:39 AM EST -----  Subject: Message to Provider    QUESTIONS  Information for Provider? call pt back, she left message on [employer] about   a week or two ago and no one responded  ---------------------------------------------------------------------------  --------------  CALL BACK INFO  [phone]; OK to leave message on voicemail  ---------------------------------------------------------------------------  --------------  SCRIPT ANSWERS  Relationship to Patient? Self   Sickle cell crisis Sickle cell crisis Sickle cell crisis Sickle cell crisis Sickle cell crisis

## 2024-03-07 NOTE — TELEPHONE ENCOUNTER
----- Message from Kierra Kang sent at 3/7/2024  9:39 AM EST -----  Subject: Message to Provider    QUESTIONS  Information for Provider? call pt back, she left message on [employer] about   a week or two ago and no one responded  ---------------------------------------------------------------------------  --------------  CALL BACK INFO  [phone]; OK to leave message on voicemail  ---------------------------------------------------------------------------  --------------  SCRIPT ANSWERS  Relationship to Patient? Self

## 2024-03-11 ENCOUNTER — TELEPHONE (OUTPATIENT)
Age: 44
End: 2024-03-11

## 2024-03-11 DIAGNOSIS — R76.8 POSITIVE SMITH ANTIBODY: ICD-10-CM

## 2024-03-11 DIAGNOSIS — R76.8 POSITIVE ANA (ANTINUCLEAR ANTIBODY): Primary | ICD-10-CM

## 2024-03-11 DIAGNOSIS — R21 MALAR RASH: ICD-10-CM

## 2024-03-11 NOTE — TELEPHONE ENCOUNTER
Referral for Dr. Macias has been entered into system.  Please let her know.  Will forward this message to Ms. Osborne if she needs insurance referral.  Thanks

## 2024-03-11 NOTE — TELEPHONE ENCOUNTER
----- Message from Blanca Kurtz sent at 3/11/2024 12:32 PM EDT -----  Subject: Referral Request    Reason for referral request? patient is requesting for referral for   rheumatology referral to Dr. Ave Chávez. [facility] [address] [address] phone # [phone]  Provider patient wants to be referred to(if known):     Provider Phone Number(if known):    Additional Information for Provider?   ---------------------------------------------------------------------------  --------------  CALL BACK INFO    [phone]; OK to leave message on voicemail  ---------------------------------------------------------------------------  --------------

## 2024-03-11 NOTE — TELEPHONE ENCOUNTER
I have done referral to Dr. Chávez.  I do not know that that she is with [facility] as she used to be with rheumatology practice at [facility]  Anyway she can call her office to set up an appointment.

## 2024-03-12 NOTE — TELEPHONE ENCOUNTER
Attempted to call patient.if patient calls back please inform that Referral for Dr. Macias has been entered into system and faxed

## 2024-03-13 NOTE — TELEPHONE ENCOUNTER
Myesha Osborne  Referral Specialist  [facility]  P: [phone]   F: [phone]

## 2024-03-14 NOTE — TELEPHONE ENCOUNTER
Spoke to pt on 3/14/24 informed her that  did put the Doctor Referral in for .And that (Myesha) got the [employer] referral approved.I did let pt know that the claim for her appt on 11/8/23 with  we are still waiting on a response form [employer],and that she may want to call them to check the status of that claim.The referral Coordinator (Myesha) did speak to the pt and informed her that she need's to start contacting the office several day's before going to these appt's.

## 2024-04-30 ENCOUNTER — OFFICE VISIT (OUTPATIENT)
Age: 44
End: 2024-04-30
Payer: COMMERCIAL

## 2024-04-30 VITALS
RESPIRATION RATE: 16 BRPM | WEIGHT: 169.2 LBS | DIASTOLIC BLOOD PRESSURE: 67 MMHG | OXYGEN SATURATION: 100 % | TEMPERATURE: 98.6 F | HEIGHT: 60 IN | HEART RATE: 78 BPM | SYSTOLIC BLOOD PRESSURE: 97 MMHG | BODY MASS INDEX: 33.22 KG/M2

## 2024-04-30 DIAGNOSIS — M10.079 ACUTE IDIOPATHIC GOUT OF FOOT, UNSPECIFIED LATERALITY: ICD-10-CM

## 2024-04-30 DIAGNOSIS — K80.20 CALCULUS OF GALLBLADDER WITHOUT CHOLECYSTITIS WITHOUT OBSTRUCTION: Primary | ICD-10-CM

## 2024-04-30 DIAGNOSIS — K58.0 IRRITABLE BOWEL SYNDROME WITH DIARRHEA: ICD-10-CM

## 2024-04-30 DIAGNOSIS — E28.2 POLYCYSTIC OVARIES: ICD-10-CM

## 2024-04-30 PROCEDURE — 99214 OFFICE O/P EST MOD 30 MIN: CPT | Performed by: FAMILY MEDICINE

## 2024-04-30 RX ORDER — MEDROXYPROGESTERONE ACETATE 10 MG/1
10 TABLET ORAL 2 TIMES DAILY
Qty: 10 TABLET | Refills: 0 | Status: SHIPPED | OUTPATIENT
Start: 2024-04-30 | End: 2024-05-05

## 2024-04-30 RX ORDER — FEBUXOSTAT 40 MG/1
40 TABLET, FILM COATED ORAL DAILY
COMMUNITY

## 2024-04-30 RX ORDER — DICYCLOMINE HCL 20 MG
20 TABLET ORAL
Qty: 30 TABLET | Refills: 0 | Status: SHIPPED | OUTPATIENT
Start: 2024-04-30

## 2024-04-30 ASSESSMENT — ENCOUNTER SYMPTOMS
CONSTIPATION: 0
COUGH: 0
ABDOMINAL PAIN: 0
CHEST TIGHTNESS: 0
BACK PAIN: 0
DIARRHEA: 0
SHORTNESS OF BREATH: 0
SORE THROAT: 0

## 2024-04-30 NOTE — PROGRESS NOTES
Date:4/30/2024        Patient Name:Ludivina Robles     YOB: 1980     Age:44 y.o.    Seen today for   Chief Complaint   Patient presents with    Cholelithiasis     Gall bladder stone    Gout     Uric acid level is high    Elbow Pain     Tennis elbow      Patient was seen today to discuss results from [facility] regarding abnormal ultrasound as well as abnormal blood work.  She is still having persistent pain in her elbows.  She has diagnosis of tennis elbow on both sides and initially was initiated by Dr. Waller and then Dr. Bustos.  She is getting physical therapy but according to patient her pain is not improving..    Patient was also referred to neurology to evaluate findings on MRI.  According to neurology, patient does not have any major neurological disease and recommended to follow-up with sleep study next cardiology.    Patient was referred to rheumatology due to positive SARA and her findings of multiple joint pain and arthralgia.  Complete workup for rheumatoid and lupus was negative.  Her uric acid was elevated 7.8 on last blood work.    Patient went to [facility] and had worsening of pain in both feet with swelling.  She was started on Uloric in [facility] with significant improvement in pain and swelling.  Also patient had abdominal ultrasound due to persistent nausea that was negative for any major intra-abdominal finding except single gallstone measuring 19 mm.    Irritable Bowel Syndrome  Patient complains of abdominal cramping, abdominal pain, and loose stools. Onset of symptoms was several months ago. Current symptoms: crampy abdominal pain: moderate: location: in the entire abdomen, abdominal bloating: moderate, anxiety: moderate, and fibromyalgia symptoms: moderate. Patient denies belching, chills, constipation, dysuria, fever, flatus, headache, hematochezia, hematuria, melena, and vomiting. Symptoms have gradually worsened. Previous visits for this problem: none. Evaluation to date has been

## 2024-04-30 NOTE — PROGRESS NOTES
Chief Complaint   Patient presents with    Cholelithiasis     Gall bladder stone    Gout     Uric acid level is high         \"Have you been to the ER, urgent care clinic since your last visit?  Hospitalized since your last visit?\"    NO    “Have you seen or consulted any other health care providers outside of [facility] since your last visit?”    NO            Click Here for Release of Records Request           2/27/2024     9:01 AM   PHQ-9    Little interest or pleasure in doing things 0   Feeling down, depressed, or hopeless 0   PHQ-2 Score 0   PHQ-9 Total Score 0           Financial Resource Strain: Low Risk  (6/27/2023)    Overall Financial Resource Strain (CARDIA)     Difficulty of Paying Living Expenses: Not hard at all      Food Insecurity: Not on file (6/27/2023)          Health Maintenance Due   Topic Date Due    Hepatitis B vaccine (1 of 3 - 3-dose series) Never done    COVID-19 Vaccine (1) Never done

## 2024-05-03 ENCOUNTER — TELEPHONE (OUTPATIENT)
Age: 44
End: 2024-05-03

## 2024-05-08 ENCOUNTER — OFFICE VISIT (OUTPATIENT)
Age: 44
End: 2024-05-08
Payer: COMMERCIAL

## 2024-05-08 VITALS
RESPIRATION RATE: 18 BRPM | HEIGHT: 60 IN | DIASTOLIC BLOOD PRESSURE: 83 MMHG | HEART RATE: 60 BPM | OXYGEN SATURATION: 98 % | BODY MASS INDEX: 33.06 KG/M2 | SYSTOLIC BLOOD PRESSURE: 114 MMHG | TEMPERATURE: 98.4 F | WEIGHT: 168.4 LBS

## 2024-05-08 DIAGNOSIS — R11.2 NAUSEA AND VOMITING, UNSPECIFIED VOMITING TYPE: Primary | ICD-10-CM

## 2024-05-08 DIAGNOSIS — K80.20 CALCULUS OF GALLBLADDER WITHOUT CHOLECYSTITIS WITHOUT OBSTRUCTION: ICD-10-CM

## 2024-05-08 PROCEDURE — 99203 OFFICE O/P NEW LOW 30 MIN: CPT | Performed by: SURGERY

## 2024-05-08 ASSESSMENT — PATIENT HEALTH QUESTIONNAIRE - PHQ9
1. LITTLE INTEREST OR PLEASURE IN DOING THINGS: NOT AT ALL
SUM OF ALL RESPONSES TO PHQ QUESTIONS 1-9: 0
SUM OF ALL RESPONSES TO PHQ9 QUESTIONS 1 & 2: 0
SUM OF ALL RESPONSES TO PHQ QUESTIONS 1-9: 0
2. FEELING DOWN, DEPRESSED OR HOPELESS: NOT AT ALL
SUM OF ALL RESPONSES TO PHQ QUESTIONS 1-9: 0
SUM OF ALL RESPONSES TO PHQ QUESTIONS 1-9: 0

## 2024-05-08 ASSESSMENT — ANXIETY QUESTIONNAIRES
3. WORRYING TOO MUCH ABOUT DIFFERENT THINGS: NOT AT ALL
6. BECOMING EASILY ANNOYED OR IRRITABLE: NOT AT ALL
2. NOT BEING ABLE TO STOP OR CONTROL WORRYING: NOT AT ALL
4. TROUBLE RELAXING: NOT AT ALL
7. FEELING AFRAID AS IF SOMETHING AWFUL MIGHT HAPPEN: NOT AT ALL
1. FEELING NERVOUS, ANXIOUS, OR ON EDGE: NOT AT ALL
IF YOU CHECKED OFF ANY PROBLEMS ON THIS QUESTIONNAIRE, HOW DIFFICULT HAVE THESE PROBLEMS MADE IT FOR YOU TO DO YOUR WORK, TAKE CARE OF THINGS AT HOME, OR GET ALONG WITH OTHER PEOPLE: NOT DIFFICULT AT ALL
GAD7 TOTAL SCORE: 0
5. BEING SO RESTLESS THAT IT IS HARD TO SIT STILL: NOT AT ALL

## 2024-05-08 NOTE — PROGRESS NOTES
[facility] History and Physical    History of Present Illness:      Ludivina Robles is a 44 y.o. female who complains of nausea when her abdomen is compressed by a seatbelt or laying on her stomach.  She says she has no abdominal pain.  Outside of any compression on her abdomen she does not complain of any abdominal pain nausea or vomiting spontaneously.  She is able to eat and drink normal foods without any issues.  She has had normal bowel movements.    Past Medical History:   Diagnosis Date    PCOS (polycystic ovarian syndrome)     Thyroid disease      Past surgical history-none      Current Outpatient Medications:     febuxostat (ULORIC) 40 MG TABS tablet, Take 1 tablet by mouth daily, Disp: , Rfl:     EUTHYROX 50 MCG tablet, Take 1 tablet by mouth in the morning on an empty stomach on SATURDAY AND SUNDAY., Disp: 24 tablet, Rfl: 0    EUTHYROX 75 MCG tablet, TAKE 1 TABLET BY MOUTH ONCE DAILY IN THE MORNING ON AN EMPTY STOMACH ON MONDAY THROUGH FRIDAY, Disp: 90 tablet, Rfl: 0    IUD'S IU, by IntraUTERine route, Disp: , Rfl:     cyanocobalamin 1000 MCG tablet, Take 1 tablet by mouth Pt taking 2000 mcg once a week, Disp: , Rfl:     vitamin D (ERGOCALCIFEROL) 1.25 MG (27824 UT) CAPS capsule, Take 1 capsule by mouth once a week Taking 15,000 once a week, Disp: , Rfl:     dicyclomine (BENTYL) 20 MG tablet, Take 1 tablet by mouth every 4-6 hours as needed (abdominal cramps) (Patient not taking: Reported on 5/8/2024), Disp: 30 tablet, Rfl: 0    medroxyPROGESTERone (PROVERA) 10 MG tablet, Take 1 tablet by mouth in the morning and at bedtime for 10 doses, Disp: 10 tablet, Rfl: 0    No Known Allergies    Social History     Socioeconomic History    Marital status:      Spouse name: Not on file    Number of children: Not on file    Years of education: Not on file    Highest education level: Not on file   Occupational History    Not on file   Tobacco Use    Smoking status:

## 2024-05-08 NOTE — PROGRESS NOTES
Identified patient with two patient identifiers (name and ). Reviewed chart in preparation for visit and have obtained necessary documentation.    Ludivina Robles is a 44 y.o. female  Chief Complaint   Patient presents with    New Patient     Calculus of gallbladder      /83 (Site: Right Upper Arm, Position: Sitting, Cuff Size: Medium Adult)   Pulse 60   Temp 98.4 °F (36.9 °C) (Axillary)   Resp 18   Ht 1.524 m (5')   Wt 76.4 kg (168 lb 6.4 oz)   LMP 2023   SpO2 98%   BMI 32.89 kg/m²     1. Have you been to the ER, urgent care clinic since your last visit?  Hospitalized since your last visit?no    2. Have you seen or consulted any other health care providers outside of the [facility] since your last visit?  Include any pap smears or colon screening. no

## 2024-05-10 ENCOUNTER — CLINICAL DOCUMENTATION (OUTPATIENT)
Age: 44
End: 2024-05-10

## 2024-05-22 ENCOUNTER — PATIENT MESSAGE (OUTPATIENT)
Age: 44
End: 2024-05-22

## 2024-05-22 DIAGNOSIS — K21.9 GASTROESOPHAGEAL REFLUX DISEASE WITHOUT ESOPHAGITIS: ICD-10-CM

## 2024-05-22 DIAGNOSIS — R11.2 NAUSEA AND VOMITING, UNSPECIFIED VOMITING TYPE: Primary | ICD-10-CM

## 2024-05-22 NOTE — TELEPHONE ENCOUNTER
From: Ludivina Robles  To: Dr. Beryl Ramirez  Sent: 5/22/2024 11:51 AM EDT  Subject: Referal for Dr arnulfo Rudolph I need a referral for Dr arnulfo carroll I got appoint for him this Friday. Dr Santizo referred me to be seen by GI doctor for nausea n vomiting.   Thank you   Ludivina

## 2024-05-23 NOTE — TELEPHONE ENCOUNTER
Gastro referral order has been updated and Referral has been entered in availity and certified in total  Will send info over to Gastro for patient's scheduled appt    Close Enc    Myesha Osborne  Referral Specialist  [facility]

## 2024-05-27 RX ORDER — LEVOTHYROXINE SODIUM 75 UG/1
TABLET ORAL
Qty: 90 TABLET | Refills: 0 | Status: SHIPPED | OUTPATIENT
Start: 2024-05-27

## 2024-07-07 RX ORDER — LEVOTHYROXINE SODIUM 50 UG/1
TABLET ORAL
Qty: 30 TABLET | Refills: 0 | Status: SHIPPED | OUTPATIENT
Start: 2024-07-07

## 2024-07-29 ENCOUNTER — OFFICE VISIT (OUTPATIENT)
Age: 44
End: 2024-07-29
Payer: COMMERCIAL

## 2024-07-29 VITALS
TEMPERATURE: 97 F | HEART RATE: 90 BPM | RESPIRATION RATE: 16 BRPM | OXYGEN SATURATION: 99 % | SYSTOLIC BLOOD PRESSURE: 105 MMHG | WEIGHT: 169 LBS | BODY MASS INDEX: 33.18 KG/M2 | DIASTOLIC BLOOD PRESSURE: 75 MMHG | HEIGHT: 60 IN

## 2024-07-29 DIAGNOSIS — L71.9 ROSACEA: ICD-10-CM

## 2024-07-29 DIAGNOSIS — R30.0 DYSURIA: ICD-10-CM

## 2024-07-29 DIAGNOSIS — E53.8 VITAMIN B12 DEFICIENCY: ICD-10-CM

## 2024-07-29 DIAGNOSIS — E66.9 CLASS 1 OBESITY WITHOUT SERIOUS COMORBIDITY IN ADULT, UNSPECIFIED BMI, UNSPECIFIED OBESITY TYPE: ICD-10-CM

## 2024-07-29 DIAGNOSIS — Z12.31 ENCOUNTER FOR SCREENING MAMMOGRAM FOR BREAST CANCER: ICD-10-CM

## 2024-07-29 DIAGNOSIS — D56.3 THALASSEMIA MINOR: ICD-10-CM

## 2024-07-29 DIAGNOSIS — M10.079 ACUTE IDIOPATHIC GOUT OF FOOT, UNSPECIFIED LATERALITY: ICD-10-CM

## 2024-07-29 DIAGNOSIS — E55.9 VITAMIN D DEFICIENCY: ICD-10-CM

## 2024-07-29 DIAGNOSIS — E03.9 HYPOTHYROIDISM IN ADULT: Primary | ICD-10-CM

## 2024-07-29 PROBLEM — K44.9 DIAPHRAGMATIC HERNIA WITHOUT OBSTRUCTION OR GANGRENE: Status: ACTIVE | Noted: 2024-06-13

## 2024-07-29 PROBLEM — D64.9 ABSOLUTE ANEMIA: Status: ACTIVE | Noted: 2024-07-29

## 2024-07-29 PROCEDURE — 99214 OFFICE O/P EST MOD 30 MIN: CPT | Performed by: FAMILY MEDICINE

## 2024-07-29 SDOH — ECONOMIC STABILITY: INCOME INSECURITY: HOW HARD IS IT FOR YOU TO PAY FOR THE VERY BASICS LIKE FOOD, HOUSING, MEDICAL CARE, AND HEATING?: NOT HARD AT ALL

## 2024-07-29 SDOH — ECONOMIC STABILITY: FOOD INSECURITY: WITHIN THE PAST 12 MONTHS, THE FOOD YOU BOUGHT JUST DIDN'T LAST AND YOU DIDN'T HAVE MONEY TO GET MORE.: NEVER TRUE

## 2024-07-29 SDOH — ECONOMIC STABILITY: FOOD INSECURITY: WITHIN THE PAST 12 MONTHS, YOU WORRIED THAT YOUR FOOD WOULD RUN OUT BEFORE YOU GOT MONEY TO BUY MORE.: NEVER TRUE

## 2024-07-29 ASSESSMENT — PATIENT HEALTH QUESTIONNAIRE - PHQ9
SUM OF ALL RESPONSES TO PHQ QUESTIONS 1-9: 0
SUM OF ALL RESPONSES TO PHQ9 QUESTIONS 1 & 2: 0
1. LITTLE INTEREST OR PLEASURE IN DOING THINGS: NOT AT ALL
SUM OF ALL RESPONSES TO PHQ QUESTIONS 1-9: 0
SUM OF ALL RESPONSES TO PHQ QUESTIONS 1-9: 0
2. FEELING DOWN, DEPRESSED OR HOPELESS: NOT AT ALL
SUM OF ALL RESPONSES TO PHQ QUESTIONS 1-9: 0

## 2024-07-29 ASSESSMENT — ENCOUNTER SYMPTOMS
SHORTNESS OF BREATH: 0
BACK PAIN: 0
ABDOMINAL PAIN: 0
COUGH: 0
CONSTIPATION: 0
SORE THROAT: 0
CHEST TIGHTNESS: 0
DIARRHEA: 0

## 2024-07-29 NOTE — PROGRESS NOTES
Chief Complaint   Patient presents with    Thyroid Problem     Follow up         \"Have you been to the ER, urgent care clinic since your last visit?  Hospitalized since your last visit?\"    NO    “Have you seen or consulted any other health care providers outside of [facility] since your last visit?”    NO    Have you had a mammogram?”   NO    No breast cancer screening on file             Click Here for Release of Records Request           7/29/2024     2:58 PM   PHQ-9    Little interest or pleasure in doing things 0   Feeling down, depressed, or hopeless 0   PHQ-2 Score 0   PHQ-9 Total Score 0           Financial Resource Strain: Low Risk  (7/29/2024)    Overall Financial Resource Strain (CARDIA)     Difficulty of Paying Living Expenses: Not hard at all      Food Insecurity: No Food Insecurity (7/29/2024)    Hunger Vital Sign     Worried About Running Out of Food in the Last Year: Never true     Ran Out of Food in the Last Year: Never true          Health Maintenance Due   Topic Date Due    Hepatitis B vaccine (1 of 3 - 3-dose series) Never done    COVID-19 Vaccine (1) Never done    Breast cancer screen  Never done        
Movements: Extraocular movements intact.      Pupils: Pupils are equal, round, and reactive to light.   Cardiovascular:      Rate and Rhythm: Normal rate and regular rhythm.      Pulses: Normal pulses.      Heart sounds: Normal heart sounds.   Pulmonary:      Effort: Pulmonary effort is normal.      Breath sounds: Normal breath sounds.   Abdominal:      Palpations: Abdomen is soft.      Tenderness: There is no abdominal tenderness. There is no guarding.   Musculoskeletal:      Cervical back: Normal range of motion.   Skin:     General: Skin is warm and dry.   Neurological:      General: No focal deficit present.      Mental Status: She is alert and oriented to person, place, and time.   Psychiatric:         Mood and Affect: Mood normal.         Behavior: Behavior normal.         Labs/Imaging/Diagnostics   Labs:  Lab Results   Component Value Date    WBC 7.1 02/02/2024    HGB 11.0 (L) 02/02/2024    HCT 36.2 02/02/2024    MCV 66.4 (L) 02/02/2024     (H) 02/02/2024     Lab Results   Component Value Date     01/29/2024    K 4.0 01/29/2024     (H) 01/29/2024    CO2 23 01/29/2024    BUN 18 01/29/2024    CREATININE 0.96 01/29/2024    GLUCOSE 85 01/29/2024    CALCIUM 9.2 01/29/2024    BILITOT 0.7 01/29/2024    ALKPHOS 50 01/29/2024    AST 30 01/29/2024    ALT 50 01/29/2024    LABGLOM >60 01/29/2024    AGRATIO 1.1 03/07/2023    GLOB 4.2 (H) 01/29/2024     Hemoglobin A1C   Date Value Ref Range Status   02/02/2024 5.6 4.0 - 5.6 % Final     Comment:     (NOTE)  HbA1C Interpretive Ranges  <5.7              Normal  5.7 - 6.4         Consider Prediabetes  >6.5              Consider Diabetes            Assessment & Plan      1. Hypothyroidism in adult  -     TSH; Future  -     T4, Free; Future  2. Thalassemia minor  -     CBC with Auto Differential; Future  3. Encounter for screening mammogram for breast cancer  -     [facility] INEZ DIGITAL SCREEN BILATERAL [[id number]]; Future  4. Acute idiopathic gout of foot,

## 2024-07-30 LAB
25(OH)D3 SERPL-MCNC: 26.6 NG/ML (ref 30–100)
BASOPHILS # BLD: 0.1 K/UL (ref 0–0.1)
BASOPHILS NFR BLD: 1 % (ref 0–1)
DIFFERENTIAL METHOD BLD: ABNORMAL
EOSINOPHIL # BLD: 0.4 K/UL (ref 0–0.4)
EOSINOPHIL NFR BLD: 5 % (ref 0–7)
ERYTHROCYTE [DISTWIDTH] IN BLOOD BY AUTOMATED COUNT: 17.1 % (ref 11.5–14.5)
HCT VFR BLD AUTO: 33 % (ref 35–47)
HGB BLD-MCNC: 9.7 G/DL (ref 11.5–16)
IMM GRANULOCYTES # BLD AUTO: 0 K/UL (ref 0–0.04)
IMM GRANULOCYTES NFR BLD AUTO: 0 % (ref 0–0.5)
LYMPHOCYTES # BLD: 2.4 K/UL (ref 0.8–3.5)
LYMPHOCYTES NFR BLD: 32 % (ref 12–49)
MCH RBC QN AUTO: 19.9 PG (ref 26–34)
MCHC RBC AUTO-ENTMCNC: 29.4 G/DL (ref 30–36.5)
MCV RBC AUTO: 67.8 FL (ref 80–99)
MONOCYTES # BLD: 0.4 K/UL (ref 0–1)
MONOCYTES NFR BLD: 6 % (ref 5–13)
NEUTS SEG # BLD: 4.1 K/UL (ref 1.8–8)
NEUTS SEG NFR BLD: 56 % (ref 32–75)
NRBC # BLD: 0 K/UL (ref 0–0.01)
NRBC BLD-RTO: 0 PER 100 WBC
PLATELET # BLD AUTO: 359 K/UL (ref 150–400)
PMV BLD AUTO: 10.6 FL (ref 8.9–12.9)
RBC # BLD AUTO: 4.87 M/UL (ref 3.8–5.2)
RBC MORPH BLD: ABNORMAL
SPECIMEN HOLD: NORMAL
T4 FREE SERPL-MCNC: 1.3 NG/DL (ref 0.8–1.5)
TSH SERPL DL<=0.05 MIU/L-ACNC: 1.78 UIU/ML (ref 0.36–3.74)
URATE SERPL-MCNC: 8 MG/DL (ref 2.6–6)
VIT B12 SERPL-MCNC: 349 PG/ML (ref 193–986)
WBC # BLD AUTO: 7.4 K/UL (ref 3.6–11)

## 2024-07-30 RX ORDER — ERGOCALCIFEROL 1.25 MG/1
50000 CAPSULE ORAL WEEKLY
Qty: 5 CAPSULE | Refills: 4 | Status: SHIPPED | OUTPATIENT
Start: 2024-07-30

## 2024-07-30 RX ORDER — FEBUXOSTAT 40 MG/1
40 TABLET, FILM COATED ORAL DAILY
Qty: 30 TABLET | Refills: 5 | Status: SHIPPED | OUTPATIENT
Start: 2024-07-30

## 2024-07-31 ENCOUNTER — TELEPHONE (OUTPATIENT)
Age: 44
End: 2024-07-31

## 2024-07-31 NOTE — TELEPHONE ENCOUNTER
FARRAH Randolph:     Per [facility], the Febuxostat 40mg is not covered and too expensive.      Patient asking for an alternative.    RX: 7/30/24 30 + 5 refills    (No alternatives provided)  ThanksChristine    **Contacted [facility], GoodRx coupon noted $14 for #30 and advised to process and will contact patient to check if this is ok.    Tried contacting patient:  Had to [facility] for return call.  Christine Haque

## 2024-08-01 ENCOUNTER — TELEPHONE (OUTPATIENT)
Age: 44
End: 2024-08-01

## 2024-08-01 DIAGNOSIS — R30.0 DYSURIA: ICD-10-CM

## 2024-08-01 DIAGNOSIS — M10.079 ACUTE IDIOPATHIC GOUT OF FOOT, UNSPECIFIED LATERALITY: Primary | ICD-10-CM

## 2024-08-01 RX ORDER — ALLOPURINOL 100 MG/1
100 TABLET ORAL DAILY
Qty: 90 TABLET | Refills: 0 | Status: SHIPPED | OUTPATIENT
Start: 2024-08-01

## 2024-08-01 NOTE — TELEPHONE ENCOUNTER
Called pt. Verified  and Name. Let pt know she needs to come back to leave a urine specimen for testing. Pt stated she will come at her earliest convenience. Provided lab hours.

## 2024-08-01 NOTE — TELEPHONE ENCOUNTER
MD Ramirez,    Patient returned call today stating that she would rather not pay for the uloric, even at $14/month.  (Insurance cost was $178- Advised Good rx $14)      Asking for alternative.    Stated does not know how long will have to take?    Advised her to contact insurance to see what is covered and she stated insurance will check the medication if we advise which ones to check.      So will need to contact patient insurance to see what is covered/preferred and cost for patient.    Current Rx: febuxostat 40mg 30 + 5 refills    ThanksChristine

## 2024-08-02 LAB
APPEARANCE UR: ABNORMAL
BACTERIA URNS QL MICRO: ABNORMAL /HPF
BILIRUB UR QL: NEGATIVE
COLOR UR: ABNORMAL
EPITH CASTS URNS QL MICRO: ABNORMAL /LPF
GLUCOSE UR STRIP.AUTO-MCNC: NEGATIVE MG/DL
HGB UR QL STRIP: NEGATIVE
HYALINE CASTS URNS QL MICRO: ABNORMAL /LPF (ref 0–5)
KETONES UR QL STRIP.AUTO: ABNORMAL MG/DL
LEUKOCYTE ESTERASE UR QL STRIP.AUTO: NEGATIVE
NITRITE UR QL STRIP.AUTO: NEGATIVE
PH UR STRIP: 5.5 (ref 5–8)
PROT UR STRIP-MCNC: NEGATIVE MG/DL
RBC #/AREA URNS HPF: ABNORMAL /HPF (ref 0–5)
SP GR UR REFRACTOMETRY: 1.03 (ref 1–1.03)
URINE CULTURE IF INDICATED: ABNORMAL
UROBILINOGEN UR QL STRIP.AUTO: 0.2 EU/DL (ref 0.2–1)
WBC URNS QL MICRO: ABNORMAL /HPF (ref 0–4)

## 2024-08-30 ENCOUNTER — PATIENT MESSAGE (OUTPATIENT)
Age: 44
End: 2024-08-30

## 2024-08-30 DIAGNOSIS — L71.9 ROSACEA: Primary | ICD-10-CM

## 2024-09-05 ENCOUNTER — TELEPHONE (OUTPATIENT)
Age: 44
End: 2024-09-05

## 2024-09-05 NOTE — TELEPHONE ENCOUNTER
Patient called stated that the dermatology stated that they could not give appointment till some office notes be faxed up.    Requesting a call back    Best call back #[phone]

## 2024-09-06 NOTE — TELEPHONE ENCOUNTER
Called and spoke with pt. Pt stated the Dermatologist office is [facility].   They will need the pt's office notes, demographics, and insurance information faxed to [phone]. The office phone number is [phone].    Pt Best Contact # [phone].

## 2024-09-09 NOTE — TELEPHONE ENCOUNTER
[facility] request an insurance referral prior to scheduling patient with one of their providers; ([facility] states we can list any of their providers on the insurance referral).    Obtained insurance referral from [facility]; copy of the [facility] insurance referral faxed to [phone].  Copy scanned into Epic.  Copy sent to patient via My Chart.

## 2024-09-10 ENCOUNTER — TELEPHONE (OUTPATIENT)
Age: 44
End: 2024-09-10

## 2024-09-10 NOTE — TELEPHONE ENCOUNTER
Please let patient know that it has been more than 6 months and it is so hard to change diagnosis code.  Also I did not put any other extra diagnoses for that visit as it was preventive care.  Cores have been processed on [facility] side.  Laboratory has also processed the records so not sure if we can change it this late.  I will talk to  and will let her know.  Thanks

## 2024-09-10 NOTE — TELEPHONE ENCOUNTER
Patient called and stated that she was instructed to call Dr. Ramirez and ask her to change the codes for her 1/29 physical. Because the ins comp denied it because she had one less than 1 year ago. Patient would like a call back to let her know the decision.      Call back at [phone]

## 2024-11-05 RX ORDER — LEVOTHYROXINE SODIUM 50 UG/1
TABLET ORAL
Qty: 30 TABLET | Refills: 0 | Status: SHIPPED | OUTPATIENT
Start: 2024-11-05

## 2024-11-27 RX ORDER — LEVOTHYROXINE SODIUM 50 UG/1
TABLET ORAL
Qty: 24 TABLET | Refills: 0 | Status: SHIPPED | OUTPATIENT
Start: 2024-11-27

## 2025-01-19 DIAGNOSIS — E55.9 VITAMIN D DEFICIENCY: ICD-10-CM

## 2025-01-19 RX ORDER — ERGOCALCIFEROL 1.25 MG/1
50000 CAPSULE, LIQUID FILLED ORAL WEEKLY
Qty: 5 CAPSULE | Refills: 0 | Status: SHIPPED | OUTPATIENT
Start: 2025-01-19

## 2025-02-25 ENCOUNTER — TELEPHONE (OUTPATIENT)
Age: 45
End: 2025-02-25

## 2025-03-02 RX ORDER — LEVOTHYROXINE SODIUM 75 UG/1
TABLET ORAL
Qty: 90 TABLET | Refills: 0 | Status: SHIPPED | OUTPATIENT
Start: 2025-03-02

## 2025-03-08 DIAGNOSIS — E55.9 VITAMIN D DEFICIENCY: ICD-10-CM

## 2025-03-09 RX ORDER — ERGOCALCIFEROL 1.25 MG/1
50000 CAPSULE, LIQUID FILLED ORAL WEEKLY
Qty: 5 CAPSULE | Refills: 0 | OUTPATIENT
Start: 2025-03-09

## 2025-03-12 ENCOUNTER — TELEPHONE (OUTPATIENT)
Age: 45
End: 2025-03-12

## 2025-03-12 NOTE — TELEPHONE ENCOUNTER
Pt called asking to get a physical sometimes this month do to insurance pt is asking for a call back with update on appt.      BCBN [phone]

## 2025-03-19 ENCOUNTER — TELEPHONE (OUTPATIENT)
Age: 45
End: 2025-03-19

## 2025-03-19 ENCOUNTER — OFFICE VISIT (OUTPATIENT)
Age: 45
End: 2025-03-19
Payer: COMMERCIAL

## 2025-03-19 VITALS
OXYGEN SATURATION: 99 % | BODY MASS INDEX: 33.38 KG/M2 | RESPIRATION RATE: 16 BRPM | HEIGHT: 60 IN | DIASTOLIC BLOOD PRESSURE: 81 MMHG | SYSTOLIC BLOOD PRESSURE: 124 MMHG | WEIGHT: 170 LBS | HEART RATE: 80 BPM | TEMPERATURE: 97.7 F

## 2025-03-19 DIAGNOSIS — R76.8 POSITIVE ANA (ANTINUCLEAR ANTIBODY): ICD-10-CM

## 2025-03-19 DIAGNOSIS — R25.2 MUSCLE CRAMPS: ICD-10-CM

## 2025-03-19 DIAGNOSIS — M77.12 LEFT TENNIS ELBOW: ICD-10-CM

## 2025-03-19 DIAGNOSIS — E55.9 VITAMIN D DEFICIENCY: ICD-10-CM

## 2025-03-19 DIAGNOSIS — E53.8 VITAMIN B12 DEFICIENCY: ICD-10-CM

## 2025-03-19 DIAGNOSIS — M79.7 FIBROMYALGIA SYNDROME: ICD-10-CM

## 2025-03-19 DIAGNOSIS — R76.8 POSITIVE SMITH ANTIBODY: ICD-10-CM

## 2025-03-19 DIAGNOSIS — F43.21 GRIEF REACTION: ICD-10-CM

## 2025-03-19 DIAGNOSIS — N60.12 FIBROCYSTIC BREAST CHANGES OF BOTH BREASTS: ICD-10-CM

## 2025-03-19 DIAGNOSIS — M10.079 ACUTE IDIOPATHIC GOUT OF FOOT, UNSPECIFIED LATERALITY: ICD-10-CM

## 2025-03-19 DIAGNOSIS — M22.2X1 PATELLOFEMORAL PAIN SYNDROME OF RIGHT KNEE: ICD-10-CM

## 2025-03-19 DIAGNOSIS — M77.11 RIGHT TENNIS ELBOW: ICD-10-CM

## 2025-03-19 DIAGNOSIS — R73.03 PREDIABETES: ICD-10-CM

## 2025-03-19 DIAGNOSIS — D50.9 MICROCYTIC HYPOCHROMIC ANEMIA: ICD-10-CM

## 2025-03-19 DIAGNOSIS — Z00.00 ENCOUNTER FOR PREVENTATIVE ADULT HEALTH CARE EXAMINATION: Primary | ICD-10-CM

## 2025-03-19 DIAGNOSIS — D56.3 THALASSEMIA MINOR: ICD-10-CM

## 2025-03-19 DIAGNOSIS — E03.9 HYPOTHYROIDISM IN ADULT: ICD-10-CM

## 2025-03-19 DIAGNOSIS — N60.11 FIBROCYSTIC BREAST CHANGES OF BOTH BREASTS: ICD-10-CM

## 2025-03-19 LAB
25(OH)D3 SERPL-MCNC: 45.3 NG/ML (ref 30–100)
ALBUMIN SERPL-MCNC: 4.1 G/DL (ref 3.5–5)
ALBUMIN/GLOB SERPL: 1 (ref 1.1–2.2)
ALP SERPL-CCNC: 53 U/L (ref 45–117)
ALT SERPL-CCNC: 30 U/L (ref 12–78)
ANION GAP SERPL CALC-SCNC: 8 MMOL/L (ref 2–12)
AST SERPL-CCNC: 24 U/L (ref 15–37)
BASOPHILS # BLD: 0.04 K/UL (ref 0–0.1)
BASOPHILS NFR BLD: 0.5 % (ref 0–1)
BILIRUB SERPL-MCNC: 0.8 MG/DL (ref 0.2–1)
BUN SERPL-MCNC: 11 MG/DL (ref 6–20)
BUN/CREAT SERPL: 11 (ref 12–20)
CALCIUM SERPL-MCNC: 10.1 MG/DL (ref 8.5–10.1)
CHLORIDE SERPL-SCNC: 103 MMOL/L (ref 97–108)
CHOLEST SERPL-MCNC: 214 MG/DL
CK SERPL-CCNC: 66 U/L (ref 26–192)
CO2 SERPL-SCNC: 25 MMOL/L (ref 21–32)
CREAT SERPL-MCNC: 1.03 MG/DL (ref 0.55–1.02)
CREAT UR-MCNC: 108 MG/DL
DIFFERENTIAL METHOD BLD: ABNORMAL
EOSINOPHIL # BLD: 0.5 K/UL (ref 0–0.4)
EOSINOPHIL NFR BLD: 6.9 % (ref 0–7)
ERYTHROCYTE [DISTWIDTH] IN BLOOD BY AUTOMATED COUNT: 16 % (ref 11.5–14.5)
ERYTHROCYTE [SEDIMENTATION RATE] IN BLOOD: 33 MM/HR (ref 0–20)
EST. AVERAGE GLUCOSE BLD GHB EST-MCNC: 140 MG/DL
GLOBULIN SER CALC-MCNC: 4.1 G/DL (ref 2–4)
GLUCOSE SERPL-MCNC: 113 MG/DL (ref 65–100)
HBA1C MFR BLD: 6.5 % (ref 4–5.6)
HCT VFR BLD AUTO: 35 % (ref 35–47)
HDLC SERPL-MCNC: 57 MG/DL
HDLC SERPL: 3.8 (ref 0–5)
HGB BLD-MCNC: 10.6 G/DL (ref 11.5–16)
IMM GRANULOCYTES # BLD AUTO: 0.01 K/UL (ref 0–0.04)
IMM GRANULOCYTES NFR BLD AUTO: 0.1 % (ref 0–0.5)
LDLC SERPL CALC-MCNC: 128.2 MG/DL (ref 0–100)
LYMPHOCYTES # BLD: 2.38 K/UL (ref 0.8–3.5)
LYMPHOCYTES NFR BLD: 32.6 % (ref 12–49)
MCH RBC QN AUTO: 19.7 PG (ref 26–34)
MCHC RBC AUTO-ENTMCNC: 30.3 G/DL (ref 30–36.5)
MCV RBC AUTO: 65.2 FL (ref 80–99)
MICROALBUMIN UR-MCNC: 0.6 MG/DL
MICROALBUMIN/CREAT UR-RTO: 6 MG/G (ref 0–30)
MONOCYTES # BLD: 0.39 K/UL (ref 0–1)
MONOCYTES NFR BLD: 5.4 % (ref 5–13)
NEUTS SEG # BLD: 3.98 K/UL (ref 1.8–8)
NEUTS SEG NFR BLD: 54.5 % (ref 32–75)
NRBC # BLD: 0 K/UL (ref 0–0.01)
NRBC BLD-RTO: 0 PER 100 WBC
PLATELET # BLD AUTO: 386 K/UL (ref 150–400)
PMV BLD AUTO: 10.2 FL (ref 8.9–12.9)
POTASSIUM SERPL-SCNC: 4.1 MMOL/L (ref 3.5–5.1)
PROT SERPL-MCNC: 8.2 G/DL (ref 6.4–8.2)
RBC # BLD AUTO: 5.37 M/UL (ref 3.8–5.2)
RBC MORPH BLD: ABNORMAL
SODIUM SERPL-SCNC: 136 MMOL/L (ref 136–145)
T4 FREE SERPL-MCNC: 1.4 NG/DL (ref 0.8–1.5)
TRIGL SERPL-MCNC: 144 MG/DL
TSH SERPL DL<=0.05 MIU/L-ACNC: 2.78 UIU/ML (ref 0.36–3.74)
URATE SERPL-MCNC: 6 MG/DL (ref 2.6–6)
VIT B12 SERPL-MCNC: 221 PG/ML (ref 193–986)
VLDLC SERPL CALC-MCNC: 28.8 MG/DL
WBC # BLD AUTO: 7.3 K/UL (ref 3.6–11)

## 2025-03-19 PROCEDURE — 99396 PREV VISIT EST AGE 40-64: CPT | Performed by: FAMILY MEDICINE

## 2025-03-19 PROCEDURE — 99214 OFFICE O/P EST MOD 30 MIN: CPT | Performed by: FAMILY MEDICINE

## 2025-03-19 RX ORDER — HYDROXYCHLOROQUINE SULFATE 200 MG/1
400 TABLET, FILM COATED ORAL DAILY
COMMUNITY
Start: 2025-03-17

## 2025-03-19 SDOH — ECONOMIC STABILITY: FOOD INSECURITY: WITHIN THE PAST 12 MONTHS, THE FOOD YOU BOUGHT JUST DIDN'T LAST AND YOU DIDN'T HAVE MONEY TO GET MORE.: NEVER TRUE

## 2025-03-19 SDOH — ECONOMIC STABILITY: FOOD INSECURITY: WITHIN THE PAST 12 MONTHS, YOU WORRIED THAT YOUR FOOD WOULD RUN OUT BEFORE YOU GOT MONEY TO BUY MORE.: NEVER TRUE

## 2025-03-19 ASSESSMENT — ENCOUNTER SYMPTOMS
CONSTIPATION: 0
ABDOMINAL PAIN: 0
CHEST TIGHTNESS: 0
DIARRHEA: 0
BACK PAIN: 0
COUGH: 0
SHORTNESS OF BREATH: 0
SORE THROAT: 0

## 2025-03-19 ASSESSMENT — PATIENT HEALTH QUESTIONNAIRE - PHQ9
SUM OF ALL RESPONSES TO PHQ QUESTIONS 1-9: 2
1. LITTLE INTEREST OR PLEASURE IN DOING THINGS: SEVERAL DAYS
SUM OF ALL RESPONSES TO PHQ QUESTIONS 1-9: 2
2. FEELING DOWN, DEPRESSED OR HOPELESS: SEVERAL DAYS

## 2025-03-19 NOTE — TELEPHONE ENCOUNTER
Spoke to pt and informed her unfortunately Sana Glaser does not have anything sooner than 3/24 due to she is out of office this week. Pt has appt 3/26 and wanted to be seen sooner. Offer 3/24 at 4 pm but she declined saying she has another appt at 3:20 pm that day.-TM 3/19/25

## 2025-03-19 NOTE — PROGRESS NOTES
Chief Complaint   Patient presents with    Annual Exam     Follow up    Urinary Frequency     Pt feels that she wants to go to the bathroom frequently with little amount of urine coming out.     Dizziness     Since Saturday pt is experiencing head spinning         \"Have you been to the ER, urgent care clinic since your last visit?  Hospitalized since your last visit?\"    NO    “Have you seen or consulted any other health care providers outside of [facility] since your last visit?”    YES - When: approximately 2 days ago.  Where and Why: [facility] .    Have you had a mammogram?”   NO    No breast cancer screening on file      “Have you had a pap smear?”    NO    No cervical cancer screening on file             Click Here for Release of Records Request           3/19/2025     8:47 AM   PHQ-9    Little interest or pleasure in doing things 1   Feeling down, depressed, or hopeless 1   PHQ-2 Score 2   PHQ-9 Total Score 2           Financial Resource Strain: Low Risk  (7/29/2024)    Overall Financial Resource Strain (CARDIA)     Difficulty of Paying Living Expenses: Not hard at all      Food Insecurity: No Food Insecurity (3/19/2025)    Hunger Vital Sign     Worried About Running Out of Food in the Last Year: Never true     Ran Out of Food in the Last Year: Never true          Health Maintenance Due   Topic Date Due    Hepatitis B vaccine (1 of 3 - 19+ 3-dose series) Never done    Breast cancer screen  Never done    Flu vaccine (1) Never done    COVID-19 Vaccine (1 - 2024-25 season) Never done    Cervical cancer screen  Never done    A1C test (Diabetic or Prediabetic)  02/02/2025

## 2025-03-19 NOTE — PROGRESS NOTES
Date:3/19/2025        Patient Name:Ludivina Robles     YOB: 1980     Age:45 y.o.    Seen today for   Chief Complaint   Patient presents with    Annual Exam     Follow up    Urinary Frequency     Pt feels that she wants to go to the bathroom frequently with little amount of urine coming out.     Dizziness     Since Saturday pt is experiencing head spinning   The patient (or guardian, if applicable) and other individuals in attendance with the patient were advised that Artificial Intelligence will be utilized during this visit to record, process the conversation to generate a clinical note, and support improvement of the AI technology. The patient (or guardian, if applicable) and other individuals in attendance at the appointment consented to the use of AI, including the recording.          HPI     History of Present Illness  The patient presents for annual physical checkup but also she has several concerns she wants to discuss including evaluation of grief reaction, hyperuricemia, hypothyroidism, hyperparathyroidism, thalassemia, patellofemoral syndrome, positive SARA, muscle cramps, and breast pain and dizziness.  Reviewed notes from recent visit with [facility], Dr. Macias.  Also reviewed notes from orthopedic specialist and neurology but she was referred for her dizziness and abnormal MRI    She has been experiencing feelings of depression following the recent death of her father in February 2025, who was 85 years old. Her mother also passed away 10 months ago due to a heart attack. She expresses interest in counseling to help manage her grief.    She reports persistent issues with burning urination, including incomplete bladder emptying and the need to urinate again within 10 to 15 minutes.    She describes a sensation of internal stretching and stiffness, happening in different areas including right flank, chest, right side neck, which she finds painful and necessitates standing and stretching

## 2025-03-21 ENCOUNTER — RESULTS FOLLOW-UP (OUTPATIENT)
Age: 45
End: 2025-03-21

## 2025-03-21 ENCOUNTER — TELEPHONE (OUTPATIENT)
Age: 45
End: 2025-03-21

## 2025-03-21 NOTE — TELEPHONE ENCOUNTER
Pt and the insurance [employer] called states that [employer] never received the referral that was supposed to be sent for orthopedic for hand and orthopedic for knee. Pt states that orthopedic for hand Shameka Malin and orthopedic for knee Dr. Tarun Dunham has yet to received referral and pt has appt on Monday with them and is needing that referral. Pt is asking for a call back with update on information about referral.    Dr. Tarun Dunham   # [phone]  [phone]    Shameka Malin  # [phone]  [phone]    [employer]   # [phone]  [phone]    Pt [facility]BN [phone]

## 2025-03-21 NOTE — TELEPHONE ENCOUNTER
Please just let patient know that all things have been taken care of including insurance referral and provider referral.  Thanks

## 2025-03-21 NOTE — RESULT ENCOUNTER NOTE
Ronni Florence,  I have reviewed your results.  Your results are very abnormal.  A1c, average sugar is in diabetic range.  You need to focus on your diet, completely stop your carbohydrate and exercise regularly.  Strongly recommend to recheck in 3 months before we decide about medication.  Please change your appointment from 6 months to 3 months.  Vitamin B12 stays very low.  Now I recommend you to start vitamin B12 injection.  If you agree, we can arrange for vitamin B12 injection in office, but you can come every week for first month and then every month, or you can take injection by yourself, as likely medication taken by mouth is not working.  CBC, blood count shows low hemoglobin, probably thalassemia but very stable.  Eosinophil count is up consistent with allergies.  Please make sure you Zyrtec or Claritin due to pollen season.  This time your kidney function is also borderline abnormal confirming you are not drinking enough water.  Make sure you have a 64 to 72 ounce of water daily.  Your fatigue and muscle cramps can be from vitamin B12 deficiency and dehydration.  CK level, marker for muscle injury is negative, so no major muscle related issues that can give you cramp.  Cholesterol results are okay but if you continue with your high sugar then we will need tighter control of your cholesterol.  Vitamin D level is very normal.  You do not need to be on high-dose.  You can still continue with over-the-counter vitamin D3 2000 units daily.  Sed rate is borderline up and urine protein is negative.  Please convey your results to Dr. Macias.  Thyroid panel is completely normal, no change in current levothyroxine dose.  Uric acid level is also within normal, no need to continue with gout medications.  So please focus on diet, limit carbs, exercise regularly, let me know if you want to do vitamin B12 injection or you can take vitamin B12 1000 mcg daily orally and we can check your levels in 3 months.  If your numbers

## 2025-03-21 NOTE — TELEPHONE ENCOUNTER
Called patient. Name and  confirmed. Advised that all things have been taken care of including insurance referral and provider referral.     Also, Pt had questions on Lab results note.     Patient would like to know what her diet should consist of?  Which form of Vit B12 is better? And will take whichever form provider recommends.   She also mention she does not have allergies that she knows of and does not take Zyrtec or any allergy meds. What else could be making her Eosinophil count raise?

## 2025-03-25 ENCOUNTER — HOSPITAL ENCOUNTER (OUTPATIENT)
Age: 45
Discharge: HOME | End: 2025-03-28
Payer: COMMERCIAL

## 2025-03-25 ENCOUNTER — TELEMEDICINE (OUTPATIENT)
Age: 45
End: 2025-03-25
Payer: COMMERCIAL

## 2025-03-25 VITALS — HEIGHT: 60 IN | BODY MASS INDEX: 33.57 KG/M2 | WEIGHT: 171 LBS

## 2025-03-25 DIAGNOSIS — E53.8 VITAMIN B12 DEFICIENCY: Primary | ICD-10-CM

## 2025-03-25 DIAGNOSIS — R73.03 PREDIABETES: ICD-10-CM

## 2025-03-25 DIAGNOSIS — Z12.31 ENCOUNTER FOR SCREENING MAMMOGRAM FOR BREAST CANCER: ICD-10-CM

## 2025-03-25 DIAGNOSIS — D72.10 EOSINOPHILIA, UNSPECIFIED TYPE: ICD-10-CM

## 2025-03-25 PROCEDURE — 77063 BREAST TOMOSYNTHESIS BI: CPT

## 2025-03-25 PROCEDURE — 99213 OFFICE O/P EST LOW 20 MIN: CPT | Performed by: FAMILY MEDICINE

## 2025-03-25 RX ORDER — CYANOCOBALAMIN 1000 UG/ML
1000 INJECTION, SOLUTION INTRAMUSCULAR; SUBCUTANEOUS
Qty: 4 ML | Refills: 0 | Status: SHIPPED | OUTPATIENT
Start: 2025-03-25 | End: 2025-03-26 | Stop reason: SDUPTHER

## 2025-03-25 ASSESSMENT — ENCOUNTER SYMPTOMS
ABDOMINAL PAIN: 0
NAUSEA: 0
DIARRHEA: 0
SHORTNESS OF BREATH: 0
SORE THROAT: 0
BACK PAIN: 0
COUGH: 0
WHEEZING: 0
CONSTIPATION: 0

## 2025-03-25 NOTE — PROGRESS NOTES
dehydration.    Additional findings include borderline abnormal kidney function, likely due to dehydration. The patient is advised to drink more water and consider flavored water to improve hydration.    The patient reports normal results from a recent mammogram and gynecological ultrasound performed by Dr. Boyce. A Pap smear and additional blood work, including tests for testosterone, prolactin, 17 hydroxyprogesterone, and DHEA, were conducted, with results pending.        Review of Systems   Constitutional:  Positive for fatigue. Negative for chills and fever.   HENT:  Negative for congestion, ear pain and sore throat.    Eyes:  Negative for visual disturbance.   Respiratory:  Negative for cough, shortness of breath and wheezing.    Cardiovascular:  Negative for chest pain and leg swelling.   Gastrointestinal:  Negative for abdominal pain, constipation, diarrhea and nausea.   Genitourinary:  Negative for dysuria.   Musculoskeletal:  Positive for myalgias. Negative for back pain and neck pain.   Skin:  Negative for rash.   Neurological:  Negative for dizziness and headaches.   Psychiatric/Behavioral:  Negative for behavioral problems. The patient is not nervous/anxious.        Prior to Visit Medications    Medication Sig Taking? Authorizing Provider   cyanocobalamin 1000 MCG/ML injection Inject 1 mL into the muscle every 7 days for 4 doses Yes Berly Ramirez MD   hydroxychloroquine (PLAQUENIL) 200 MG tablet Take 2 tablets by mouth daily  ProviderGagan MD   levonorgestrel (MIRENA) IUD 52 mg 1 each by IntraUTERine route once  ProviderGagan MD   EUTHYROX 75 MCG tablet TAKE 1 TABLET BY MOUTH ONCE DAILY IN THE MORNING ON AN EMPTY STOMACH MONDAYS  THROUGH  FRIDAYS  Beryl Ramirez MD   vitamin D (ERGOCALCIFEROL) 1.25 MG (37219 UT) CAPS capsule Take 1 capsule by mouth once a week  Beryl Ramirez MD   levothyroxine (SYNTHROID) 50 MCG tablet TAKE 1 TABLET BY MOUTH EVERY SATURDAY AND 1 TABLET EVERY

## 2025-03-26 ENCOUNTER — OFFICE VISIT (OUTPATIENT)
Age: 45
End: 2025-03-26
Payer: COMMERCIAL

## 2025-03-26 ENCOUNTER — CLINICAL SUPPORT (OUTPATIENT)
Age: 45
End: 2025-03-26
Payer: COMMERCIAL

## 2025-03-26 DIAGNOSIS — E53.8 VITAMIN B12 DEFICIENCY: Primary | ICD-10-CM

## 2025-03-26 DIAGNOSIS — F32.89 OTHER DEPRESSION: Primary | ICD-10-CM

## 2025-03-26 PROCEDURE — 96372 THER/PROPH/DIAG INJ SC/IM: CPT | Performed by: FAMILY MEDICINE

## 2025-03-26 PROCEDURE — 90791 PSYCH DIAGNOSTIC EVALUATION: CPT | Performed by: SOCIAL WORKER

## 2025-03-26 RX ORDER — CYANOCOBALAMIN 1000 UG/ML
1000 INJECTION, SOLUTION INTRAMUSCULAR; SUBCUTANEOUS ONCE
Status: COMPLETED | OUTPATIENT
Start: 2025-03-26 | End: 2025-03-26

## 2025-03-26 RX ADMIN — CYANOCOBALAMIN 1000 MCG: 1000 INJECTION, SOLUTION INTRAMUSCULAR; SUBCUTANEOUS at 15:30

## 2025-03-26 NOTE — PROGRESS NOTES
In Office-Initial Evaluation Billing    Session Time     Start Time:    2:00 pm  Finish Time:  3:07 pm    Total time spent for this encounter:  67 minutes    We did set a follow-up appointment with this clinician.      Return in about 2 weeks (around 4/9/2025).     Therapy Modalities   Grief and loss    Assessment / Plan     Ludivina Robles is a 45 y.o. female who is alert and oriented X3.  She does not have any suicidal or homicidal ideations.  Patient is not psychotic or delusional.   She has not been psychiatrically admitted to a hospital. Ms. Robles does have good eye contact during this interview. She is  verbal and engaging.  Patient does have good insight and judgement.  She is  a good candidate for short term therapy to address the above issues.      Psychotherapy Target Symptoms:  change in daily functioning , decreased ADLs , and depressed mood    Assessment:   initial assessment    Plan:  continue psychotherapy and continue exercise at gym    1. Other depression       --Sana Glaser LCSW on 3/26/2025 at 3:10 PM    An electronic signature was used to authenticate this note.

## 2025-03-26 NOTE — PSYCHOTHERAPY
In Office-Initial Evaluation    Time Start:2:00 pm  Time End:3:07 pm    DX:    Diagnosis Orders   1. Other depression                 Met with Ms. Robles 3/26/2025 for our first appointment.  This patient reports she was referred by her PCP as she has been feeling more depressed over the last few weeks.  Upon her hx, she states that she lost her mother, 64 years old, last March.  Then 1 year later, she lost her father this past February from a \"brain stroke.\"  Ms. Robles reports she \"cried and cried\" when her mother passed but she has not shed a tear for her father.  She is unsure why as she had a really good relationship with him.  We discussed that with the loss of both her parents in such a short time, she might be in shock as well as having problems processing so much loss at once.  She apparently spoke to her mother daily and then after her mother passed, she called and talked to her father.  She goes on to say that her father, 85, was more of an expected death as he had been sick for awhile.  For her mother, it was a surprise and not expected.  Ms. Robles is from [place] and moved to the  when she was 27 years.  She and her  are from an arranged marriage and are happily together.  They have two children, a boy, 8 years old and a 13 year old daughter.  Her  owns his own business and she helps occasionally.  Ms. Robles reports she has become isolated, lost interest in exercising and other hobbies and has become more detached in her parenting.  She wants to \"cry and let out my feelings,\" however, she has not been able to do that yet.    This patient will return in two weeks.  It has been discussed that she work on creating a routine daily as she has free time from when her  leaves for work around 9 and when she picks up the children around 2:20.  She admits she feels better when she exercises but she has stopped that within the last week.      Ludivina Robles is a 45 y.o. female who

## 2025-03-26 NOTE — PROGRESS NOTES
Chief Complaint   Patient presents with    Immunizations     Vitamin B12 injection       No Known Allergies     There were no vitals filed for this visit.

## 2025-04-09 ENCOUNTER — OFFICE VISIT (OUTPATIENT)
Age: 45
End: 2025-04-09
Payer: COMMERCIAL

## 2025-04-09 ENCOUNTER — TELEPHONE (OUTPATIENT)
Age: 45
End: 2025-04-09

## 2025-04-09 ENCOUNTER — CLINICAL SUPPORT (OUTPATIENT)
Age: 45
End: 2025-04-09
Payer: COMMERCIAL

## 2025-04-09 VITALS
SYSTOLIC BLOOD PRESSURE: 109 MMHG | RESPIRATION RATE: 16 BRPM | OXYGEN SATURATION: 99 % | HEART RATE: 76 BPM | TEMPERATURE: 97.5 F | HEIGHT: 60 IN | WEIGHT: 172 LBS | DIASTOLIC BLOOD PRESSURE: 73 MMHG | BODY MASS INDEX: 33.77 KG/M2

## 2025-04-09 DIAGNOSIS — H01.005 BLEPHARITIS OF LEFT LOWER EYELID, UNSPECIFIED TYPE: ICD-10-CM

## 2025-04-09 DIAGNOSIS — F32.89 OTHER DEPRESSION: Primary | ICD-10-CM

## 2025-04-09 DIAGNOSIS — E53.8 VITAMIN B12 DEFICIENCY: Primary | ICD-10-CM

## 2025-04-09 DIAGNOSIS — N30.10 INTERSTITIAL CYSTITIS: ICD-10-CM

## 2025-04-09 PROCEDURE — 99213 OFFICE O/P EST LOW 20 MIN: CPT | Performed by: FAMILY MEDICINE

## 2025-04-09 PROCEDURE — 90837 PSYTX W PT 60 MINUTES: CPT | Performed by: SOCIAL WORKER

## 2025-04-09 PROCEDURE — 96372 THER/PROPH/DIAG INJ SC/IM: CPT | Performed by: FAMILY MEDICINE

## 2025-04-09 RX ORDER — METFORMIN HYDROCHLORIDE 500 MG/1
1000 TABLET, EXTENDED RELEASE ORAL
COMMUNITY
Start: 2025-04-07

## 2025-04-09 RX ORDER — SYRINGE W-NEEDLE,DISPOSAB,3 ML 25GX5/8"
SYRINGE, EMPTY DISPOSABLE MISCELLANEOUS
Qty: 10 EACH | Refills: 0 | Status: SHIPPED | OUTPATIENT
Start: 2025-04-09

## 2025-04-09 RX ORDER — ERYTHROMYCIN 5 MG/G
OINTMENT OPHTHALMIC
Qty: 3.5 G | Refills: 0 | Status: SHIPPED | OUTPATIENT
Start: 2025-04-09

## 2025-04-09 RX ORDER — CYANOCOBALAMIN 1000 UG/ML
1000 INJECTION, SOLUTION INTRAMUSCULAR; SUBCUTANEOUS ONCE
Status: COMPLETED | OUTPATIENT
Start: 2025-04-09 | End: 2025-04-09

## 2025-04-09 RX ORDER — CYANOCOBALAMIN 1000 UG/ML
1000 INJECTION, SOLUTION INTRAMUSCULAR; SUBCUTANEOUS
Qty: 4 ML | Refills: 0 | Status: SHIPPED | OUTPATIENT
Start: 2025-04-09

## 2025-04-09 RX ORDER — NYSTATIN AND TRIAMCINOLONE ACETONIDE 100000; 1 [USP'U]/G; MG/G
CREAM TOPICAL 2 TIMES DAILY
COMMUNITY
Start: 2024-09-04

## 2025-04-09 RX ADMIN — CYANOCOBALAMIN 1000 MCG: 1000 INJECTION, SOLUTION INTRAMUSCULAR; SUBCUTANEOUS at 11:25

## 2025-04-09 ASSESSMENT — ENCOUNTER SYMPTOMS
SORE THROAT: 0
DIARRHEA: 0
CONSTIPATION: 0
CHEST TIGHTNESS: 0
BACK PAIN: 0
COUGH: 0
ABDOMINAL PAIN: 0
SHORTNESS OF BREATH: 0

## 2025-04-09 ASSESSMENT — PATIENT HEALTH QUESTIONNAIRE - PHQ9
4. FEELING TIRED OR HAVING LITTLE ENERGY: NOT AT ALL
7. TROUBLE CONCENTRATING ON THINGS, SUCH AS READING THE NEWSPAPER OR WATCHING TELEVISION: NOT AT ALL
9. THOUGHTS THAT YOU WOULD BE BETTER OFF DEAD, OR OF HURTING YOURSELF: NOT AT ALL
3. TROUBLE FALLING OR STAYING ASLEEP: NOT AT ALL
5. POOR APPETITE OR OVEREATING: NOT AT ALL
SUM OF ALL RESPONSES TO PHQ QUESTIONS 1-9: 0
8. MOVING OR SPEAKING SO SLOWLY THAT OTHER PEOPLE COULD HAVE NOTICED. OR THE OPPOSITE, BEING SO FIGETY OR RESTLESS THAT YOU HAVE BEEN MOVING AROUND A LOT MORE THAN USUAL: NOT AT ALL
SUM OF ALL RESPONSES TO PHQ QUESTIONS 1-9: 0
2. FEELING DOWN, DEPRESSED OR HOPELESS: NOT AT ALL
SUM OF ALL RESPONSES TO PHQ QUESTIONS 1-9: 0
6. FEELING BAD ABOUT YOURSELF - OR THAT YOU ARE A FAILURE OR HAVE LET YOURSELF OR YOUR FAMILY DOWN: NOT AT ALL
SUM OF ALL RESPONSES TO PHQ QUESTIONS 1-9: 0
1. LITTLE INTEREST OR PLEASURE IN DOING THINGS: NOT AT ALL
10. IF YOU CHECKED OFF ANY PROBLEMS, HOW DIFFICULT HAVE THESE PROBLEMS MADE IT FOR YOU TO DO YOUR WORK, TAKE CARE OF THINGS AT HOME, OR GET ALONG WITH OTHER PEOPLE: NOT DIFFICULT AT ALL

## 2025-04-09 NOTE — PROGRESS NOTES
Date:4/9/2025        Patient Name:Ludivina Robles     YOB: 1980     Age:45 y.o.    Seen today for   Chief Complaint   Patient presents with    Eye Swelling     left eye swelling. It is painful, it started yesterday when pt woke up, no itching     Urinary Frequency     More often no completing emptying. There is an urge to go to the bathroom even after voiding    The patient (or guardian, if applicable) and other individuals in attendance with the patient were advised that Artificial Intelligence will be utilized during this visit to record, process the conversation to generate a clinical note, and support improvement of the AI technology. The patient (or guardian, if applicable) and other individuals in attendance at the appointment consented to the use of AI, including the recording.      HPI     History of Present Illness  The patient presents for evaluation of vitamin B12 deficiency, prediabetes, dense breast tissue, frequent and painful urination as well as swelling and pain of left lower eyelid    Vitamin B12 deficiency has been diagnosed, and treatment is ongoing. She received her vitamin B12 injection today and has completed 2 weeks of the prescribed regimen. A friend who is a nurse can administer the injections.    Prediabetes has been diagnosed, and she is seeking to acquire a home glucose monitoring device. Metformin was prescribed by Dr. Vega for hormonal regulation and diabetes management.  He was advised to take metformin few years ago, it was discontinued after a prolonged period. She resumed metformin therapy yesterday evening, with plans to increase the dosage to two tablets after one week.    A mammogram revealed dense breast tissue, and she was advised to consult with her referring physician regarding this finding.    Frequent urination is reported, a symptom previously discussed with Dr. Vega  who informed her that it is not related to her current health conditions.    An upcoming 
of Food in the Last Year: Never true          Health Maintenance Due   Topic Date Due    Hepatitis B vaccine (1 of 3 - 19+ 3-dose series) Never done    COVID-19 Vaccine (3 - 2024-25 season) 09/01/2024    Cervical cancer screen  Never done

## 2025-04-09 NOTE — TELEPHONE ENCOUNTER
[facility] called stated that insulin Syringe-Needle U can not be use for Vitamin B12 injection  has to use TB syringe has be  25 g.    Requesting a call back    Best call back # [phone]

## 2025-04-09 NOTE — PROGRESS NOTES
Chief Complaint   Patient presents with    Injections     Vitamin B12 every week for one month 1000mcg       No Known Allergies     There were no vitals filed for this visit.

## 2025-04-09 NOTE — PROGRESS NOTES
In office Follow Up Billing    Session Time     Start Time:    10:05 am  Finish Time:  10:58 am    Total time spent for this encounter:  53 minutes    We did not set a follow-up appointment with this clinician.      Return if symptoms worsen or fail to improve.     Therapy Modalities   Building Self Esteem, Client Empowerment, Homework / Exercises, and grief and loss    Assessment / Plan     Psychotherapy Target Symptoms:  depressed mood, tearful, and energy and motivation    Assessment:   minimal progress -pt continues to report watching tv and \"scrolling on the phone.\"  Pt did state she dressed up one day for a [employer] holiday and enjoyed the day.  She complains of her health conditions and how they prevent her from doing any exercises or other activities.  The patient reports that she wants to do more but doesn't have the energy or motivation.  No acute symptoms reported.     Plan:   pt wants to call if she needs services    1. Other depression       --Sana Glaser LCSW on 4/9/2025 at 10:56 AM    An electronic signature was used to authenticate this note.

## 2025-04-09 NOTE — PSYCHOTHERAPY
In office -Follow Up    Time Start:10:05 am  Time End:10: 58 am    DX:    Diagnosis Orders   1. Other depression             Met with Ms. Robles today for our follow up appt. minimal progress-pt continues to report watching tv and \"scrolling on the phone.\"  Pt did state she dressed up one day for a Worship holiday and enjoyed the day.  She complains of her health conditions and how they prevent her from doing any exercises or other activities.  The patient reports that she wants to do more but doesn't have the energy or motivation.  No acute symptoms reported.     We did not set a follow-up appointment with this clinician.      Follow-up appt date (if applicable) is:  n/a    Sana Glaser LCSW

## 2025-04-18 ENCOUNTER — HOSPITAL ENCOUNTER (EMERGENCY)
Facility: HOSPITAL | Age: 45
Discharge: HOME | End: 2025-04-18
Attending: EMERGENCY MEDICINE
Payer: COMMERCIAL

## 2025-04-18 ENCOUNTER — TELEPHONE (OUTPATIENT)
Age: 45
End: 2025-04-18

## 2025-04-18 ENCOUNTER — APPOINTMENT (OUTPATIENT)
Facility: HOSPITAL | Age: 45
End: 2025-04-18
Payer: COMMERCIAL

## 2025-04-18 VITALS
WEIGHT: 170.64 LBS | HEIGHT: 61 IN | OXYGEN SATURATION: 99 % | BODY MASS INDEX: 32.22 KG/M2 | SYSTOLIC BLOOD PRESSURE: 146 MMHG | RESPIRATION RATE: 20 BRPM | TEMPERATURE: 97.8 F | HEART RATE: 75 BPM | DIASTOLIC BLOOD PRESSURE: 71 MMHG

## 2025-04-18 DIAGNOSIS — R10.12 LEFT UPPER QUADRANT ABDOMINAL PAIN: Primary | ICD-10-CM

## 2025-04-18 LAB
ALBUMIN SERPL-MCNC: 3.9 G/DL (ref 3.5–5)
ALBUMIN/GLOB SERPL: 0.8 (ref 1.1–2.2)
ALP SERPL-CCNC: 57 U/L (ref 45–117)
ALT SERPL-CCNC: 31 U/L (ref 12–78)
ANION GAP SERPL CALC-SCNC: 11 MMOL/L (ref 2–12)
APPEARANCE UR: CLEAR
AST SERPL-CCNC: 32 U/L (ref 15–37)
BACTERIA URNS QL MICRO: NEGATIVE /HPF
BASOPHILS # BLD: 0 K/UL (ref 0–0.1)
BASOPHILS NFR BLD: 0 % (ref 0–1)
BILIRUB SERPL-MCNC: 0.6 MG/DL (ref 0.2–1)
BILIRUB UR QL: NEGATIVE
BUN SERPL-MCNC: 14 MG/DL (ref 6–20)
BUN/CREAT SERPL: 13 (ref 12–20)
CALCIUM SERPL-MCNC: 9.7 MG/DL (ref 8.5–10.1)
CHLORIDE SERPL-SCNC: 99 MMOL/L (ref 97–108)
CO2 SERPL-SCNC: 28 MMOL/L (ref 21–32)
COLOR UR: NORMAL
CREAT SERPL-MCNC: 1.07 MG/DL (ref 0.55–1.02)
DIFFERENTIAL METHOD BLD: ABNORMAL
EOSINOPHIL # BLD: 0.56 K/UL (ref 0–0.4)
EOSINOPHIL NFR BLD: 6 % (ref 0–7)
EPITH CASTS URNS QL MICRO: NORMAL /LPF
ERYTHROCYTE [DISTWIDTH] IN BLOOD BY AUTOMATED COUNT: 16.5 % (ref 11.5–14.5)
GLOBULIN SER CALC-MCNC: 4.9 G/DL (ref 2–4)
GLUCOSE SERPL-MCNC: 103 MG/DL (ref 65–100)
GLUCOSE UR STRIP.AUTO-MCNC: NEGATIVE MG/DL
HCG UR QL: NEGATIVE
HCT VFR BLD AUTO: 34.6 % (ref 35–47)
HGB BLD-MCNC: 10.7 G/DL (ref 11.5–16)
HGB UR QL STRIP: NEGATIVE
IMM GRANULOCYTES # BLD AUTO: 0 K/UL
IMM GRANULOCYTES NFR BLD AUTO: 0 %
KETONES UR QL STRIP.AUTO: NEGATIVE MG/DL
LEUKOCYTE ESTERASE UR QL STRIP.AUTO: NEGATIVE
LIPASE SERPL-CCNC: 37 U/L (ref 13–75)
LYMPHOCYTES # BLD: 2.14 K/UL (ref 0.8–3.5)
LYMPHOCYTES NFR BLD: 23 % (ref 12–49)
MCH RBC QN AUTO: 19.8 PG (ref 26–34)
MCHC RBC AUTO-ENTMCNC: 30.9 G/DL (ref 30–36.5)
MCV RBC AUTO: 64 FL (ref 80–99)
MONOCYTES # BLD: 0.37 K/UL (ref 0–1)
MONOCYTES NFR BLD: 4 % (ref 5–13)
NEUTS BAND NFR BLD MANUAL: 2 % (ref 0–6)
NEUTS SEG # BLD: 6.23 K/UL (ref 1.8–8)
NEUTS SEG NFR BLD: 65 % (ref 32–75)
NITRITE UR QL STRIP.AUTO: NEGATIVE
NRBC # BLD: 0 K/UL (ref 0–0.01)
NRBC BLD-RTO: 0 PER 100 WBC
PH UR STRIP: 6.5 (ref 5–8)
PLATELET # BLD AUTO: 393 K/UL (ref 150–400)
PMV BLD AUTO: 10.5 FL (ref 8.9–12.9)
POTASSIUM SERPL-SCNC: 4.2 MMOL/L (ref 3.5–5.1)
PROT SERPL-MCNC: 8.8 G/DL (ref 6.4–8.2)
PROT UR STRIP-MCNC: NEGATIVE MG/DL
RBC # BLD AUTO: 5.41 M/UL (ref 3.8–5.2)
RBC #/AREA URNS HPF: NORMAL /HPF (ref 0–5)
RBC MORPH BLD: ABNORMAL
SODIUM SERPL-SCNC: 138 MMOL/L (ref 136–145)
SP GR UR REFRACTOMETRY: 1 (ref 1–1.03)
UROBILINOGEN UR QL STRIP.AUTO: 0.2 EU/DL (ref 0.2–1)
WBC # BLD AUTO: 9.3 K/UL (ref 3.6–11)
WBC URNS QL MICRO: NORMAL /HPF (ref 0–4)

## 2025-04-18 PROCEDURE — 80053 COMPREHEN METABOLIC PANEL: CPT

## 2025-04-18 PROCEDURE — 6360000004 HC RX CONTRAST MEDICATION: Performed by: PHYSICIAN ASSISTANT

## 2025-04-18 PROCEDURE — 6370000000 HC RX 637 (ALT 250 FOR IP): Performed by: PHYSICIAN ASSISTANT

## 2025-04-18 PROCEDURE — 81001 URINALYSIS AUTO W/SCOPE: CPT

## 2025-04-18 PROCEDURE — 83690 ASSAY OF LIPASE: CPT

## 2025-04-18 PROCEDURE — 85025 COMPLETE CBC W/AUTO DIFF WBC: CPT

## 2025-04-18 PROCEDURE — 99285 EMERGENCY DEPT VISIT HI MDM: CPT

## 2025-04-18 PROCEDURE — 81025 URINE PREGNANCY TEST: CPT

## 2025-04-18 PROCEDURE — 74177 CT ABD & PELVIS W/CONTRAST: CPT

## 2025-04-18 RX ORDER — IOPAMIDOL 755 MG/ML
100 INJECTION, SOLUTION INTRAVASCULAR
Status: COMPLETED | OUTPATIENT
Start: 2025-04-18 | End: 2025-04-18

## 2025-04-18 RX ORDER — ACETAMINOPHEN 500 MG
1000 TABLET ORAL
Status: COMPLETED | OUTPATIENT
Start: 2025-04-18 | End: 2025-04-18

## 2025-04-18 RX ORDER — DICYCLOMINE HCL 20 MG
20 TABLET ORAL 4 TIMES DAILY
Qty: 120 TABLET | Refills: 0 | Status: SHIPPED | OUTPATIENT
Start: 2025-04-18

## 2025-04-18 RX ORDER — ACETAMINOPHEN 500 MG
1000 TABLET ORAL EVERY 6 HOURS PRN
Qty: 80 TABLET | Refills: 0 | Status: SHIPPED | OUTPATIENT
Start: 2025-04-18 | End: 2025-04-28

## 2025-04-18 RX ORDER — OMEPRAZOLE 40 MG/1
40 CAPSULE, DELAYED RELEASE ORAL
Qty: 30 CAPSULE | Refills: 0 | Status: SHIPPED | OUTPATIENT
Start: 2025-04-18

## 2025-04-18 RX ORDER — KETOROLAC TROMETHAMINE 30 MG/ML
15 INJECTION, SOLUTION INTRAMUSCULAR; INTRAVENOUS
Status: DISCONTINUED | OUTPATIENT
Start: 2025-04-18 | End: 2025-04-18

## 2025-04-18 RX ORDER — POLYETHYLENE GLYCOL 3350 17 G/17G
17 POWDER, FOR SOLUTION ORAL DAILY PRN
Qty: 510 G | Refills: 0 | Status: SHIPPED | OUTPATIENT
Start: 2025-04-18 | End: 2025-04-18

## 2025-04-18 RX ORDER — KETOROLAC TROMETHAMINE 30 MG/ML
15 INJECTION, SOLUTION INTRAMUSCULAR; INTRAVENOUS
Status: DISCONTINUED | OUTPATIENT
Start: 2025-04-18 | End: 2025-04-18 | Stop reason: HOSPADM

## 2025-04-18 RX ADMIN — ALUMINUM HYDROXIDE, MAGNESIUM HYDROXIDE, AND SIMETHICONE 40 ML: 1200; 120; 1200 SUSPENSION ORAL at 20:06

## 2025-04-18 RX ADMIN — ACETAMINOPHEN 1000 MG: 500 TABLET ORAL at 17:50

## 2025-04-18 RX ADMIN — IOPAMIDOL 100 ML: 755 INJECTION, SOLUTION INTRAVENOUS at 18:14

## 2025-04-18 ASSESSMENT — PAIN SCALES - GENERAL
PAINLEVEL_OUTOF10: 10
PAINLEVEL_OUTOF10: 9
PAINLEVEL_OUTOF10: 2

## 2025-04-18 ASSESSMENT — PAIN DESCRIPTION - ORIENTATION: ORIENTATION: LEFT;LOWER

## 2025-04-18 ASSESSMENT — PAIN DESCRIPTION - PAIN TYPE: TYPE: ACUTE PAIN

## 2025-04-18 ASSESSMENT — LIFESTYLE VARIABLES
HOW MANY STANDARD DRINKS CONTAINING ALCOHOL DO YOU HAVE ON A TYPICAL DAY: PATIENT DOES NOT DRINK
HOW OFTEN DO YOU HAVE A DRINK CONTAINING ALCOHOL: NEVER

## 2025-04-18 ASSESSMENT — PAIN DESCRIPTION - DESCRIPTORS: DESCRIPTORS: CRAMPING

## 2025-04-18 ASSESSMENT — PAIN - FUNCTIONAL ASSESSMENT
PAIN_FUNCTIONAL_ASSESSMENT: 0-10
PAIN_FUNCTIONAL_ASSESSMENT: ACTIVITIES ARE NOT PREVENTED

## 2025-04-18 ASSESSMENT — PAIN DESCRIPTION - LOCATION: LOCATION: ABDOMEN

## 2025-04-18 NOTE — TELEPHONE ENCOUNTER
PT's  called stating PT was experiencing stomach pain and wanted to speak to Dr. Ramirez's nurse, PT was advised to reach out to DR bill bell.

## 2025-04-18 NOTE — DISCHARGE INSTRUCTIONS
Continue taking your metformin.  Initiate Bentyl for stomach pain and continue taking Tylenol as needed for stomach pain.  You should also initiate omeprazole for 30 days.  Call your gastroenterology specialist for follow-up appointment.  You should also follow-up with your primary care provider.  If you have severe worsening of symptoms, return for reevaluation.

## 2025-04-18 NOTE — ED PROVIDER NOTES
[facility]  EMERGENCY DEPARTMENT ENCOUNTER      Pt Name: Ludivina Robles  MRN: [id number]  Birthdate 1980  Date of evaluation: 4/18/2025  Provider: Heladio Kasper PA-C    CHIEF COMPLAINT       Chief Complaint   Patient presents with    Abdominal Pain    Diarrhea         HISTORY OF PRESENT ILLNESS   (Location/Symptom, Timing/Onset, Context/Setting, Quality, Duration, Modifying Factors, Severity)  Note limiting factors.   Patient presents reporting left upper quadrant abdominal pain that began yesterday.  She reports she has also had frequent nonbloody loose stools.  She is never had pain like this before.  She tried taking [place] and Pepto-Bismol without improvement.  She reports she just recently started metformin in the past week.    She otherwise denies nausea/vomiting, dysuria, shortness of breath, sick contacts, Chest pain.            Review of External Medical Records:     Nursing Notes were reviewed.    REVIEW OF SYSTEMS    (2-9 systems for level 4, 10 or more for level 5)     Review of Systems    Except as noted above the remainder of the review of systems was reviewed and negative.       PAST MEDICAL HISTORY     Past Medical History:   Diagnosis Date    H pylori ulcer     PCOS (polycystic ovarian syndrome)     Thyroid disease          SURGICAL HISTORY       Past Surgical History:   Procedure Laterality Date    UPPER GASTROINTESTINAL ENDOSCOPY           CURRENT MEDICATIONS       Discharge Medication List as of 4/18/2025  8:17 PM        CONTINUE these medications which have NOT CHANGED    Details   diclofenac (VOLTAREN) 75 MG EC tablet Take 1 tablet by mouth 2 times daily, Disp-60 tablet, R-3Normal      cyanocobalamin 1000 MCG/ML injection Inject 1 mL into the muscle every 7 days, Disp-4 mL, R-0Normal      Insulin Syringe-Needle U-100 31G X 15/64\" 1 ML MISC Disp-10 each, R-0, NormalUse as directed to inject medication weekly      metFORMIN (GLUCOPHAGE-XR) 500 MG

## 2025-04-23 ENCOUNTER — PATIENT MESSAGE (OUTPATIENT)
Age: 45
End: 2025-04-23

## 2025-04-23 ENCOUNTER — TELEPHONE (OUTPATIENT)
Age: 45
End: 2025-04-23

## 2025-04-23 DIAGNOSIS — R10.84 GENERALIZED ABDOMINAL PAIN: Primary | ICD-10-CM

## 2025-04-23 NOTE — TELEPHONE ENCOUNTER
Patient called stated that she went to ER on last Friday and was told to see a GI doctor.    Requesting a call back    Best call back # [phone]

## 2025-04-28 ENCOUNTER — TELEPHONE (OUTPATIENT)
Age: 45
End: 2025-04-28

## 2025-04-28 NOTE — TELEPHONE ENCOUNTER
PT called stating they need an insurance referral for upcoming appt with Gastroerology. Call back PT for further clarification #[phone]

## 2025-04-28 NOTE — TELEPHONE ENCOUNTER
Outbound call to patient. Name and  verified. Informed pt that referral specialist is not available but will forward to practice supervisor for assistance

## 2025-04-28 NOTE — TELEPHONE ENCOUNTER
Outbound call to patient. Name and  verified. Informed pt that referral has been faxed.  Fax number    Informed pt that her insurance referral was already done and back dated to 25 after checking with practice supervisor. If patient needs endoscopy insurance referral it comes from specialist office

## 2025-05-12 NOTE — ED PROVIDER NOTES
[facility]  EMERGENCY DEPARTMENT ENCOUNTER      Pt Name: Declan Vazquez  MRN: [id number]  [place] 1980  Date of evaluation: 5/23/2023  Provider: Claudia Mckenzie, [address] [place] [address]       Chief Complaint   Patient presents with    Shoulder Pain         HISTORY OF PRESENT ILLNESS    HPI    Declan Vazquez is a 41 y.o. female with a past medical history of hypothyroidism, PCOS who presents to the emergency department for evaluation of shoulder pain. Patient notes yesterday she developed left shoulder pain, nontraumatic in nature. It came on without any preceding lifting or exercising. Notes today she has had some left-sided scapular pain as well. She has not taken any medications for her symptoms. No associated chest pain, shortness of breath, nausea, vomiting. No fevers. No other recent illness. Nursing Notes were reviewed. REVIEW OF SYSTEMS       Review of Systems   Constitutional:  Negative for chills and fever. HENT:  Negative for congestion and rhinorrhea. Eyes:  Negative for discharge and redness. Respiratory:  Negative for shortness of breath. Cardiovascular:  Negative for chest pain. Gastrointestinal:  Negative for diarrhea, nausea and vomiting. Musculoskeletal:  Positive for arthralgias. Neurological:  Negative for speech difficulty, weakness and numbness. Psychiatric/Behavioral:  Negative for agitation. PAST MEDICAL HISTORY     Past Medical History:   Diagnosis Date    PCOS (polycystic ovarian syndrome)     Thyroid disease          SURGICAL HISTORY     History reviewed. No pertinent surgical history.       CURRENT MEDICATIONS       Previous Medications    CYANOCOBALAMIN 1000 MCG TABLET    Take 1 tablet by mouth    EUTHYROX 50 MCG TABLET    TAKE 1 TABLET BY MOUTH IN THE MORNING ON AN EMPTY STOMACH ON SATURDAY AND SUNDAY    EUTHYROX 75 MCG TABLET    TAKE 1 TABLET BY MOUTH EVERY MORNING ON AN EMPTY STOMACH, MON-FRI    HYDROXYZINE HCL (ATARAX) 50
patient reports a burning sensation around her navel.  She reports it is intermittent and is worsened after she eats.  She currently takes Prevacid 15 mg  before supper and 20 mg Pepcid before bedtime.  I will bump the Prevacid up to 30 mg and have her take it 30 minutes before breakfast and take the Pepcid 30 minutes before supper.  will go ahead and order a CT scan of her abdomen to evaluate for any abnormalities.  She is leaving next week to go to [place] to be with her son and their new baby so I think a CT would be the best option to get done before she leaves.  Encouraged her to eat a high-fiber diet.  She has a history of constipation so encouraged her to use MiraLax and start a fiber supplement to balance that with her history of IBS-D.    Counseled her on NSAID use and recommended she use Tylenol instead of a leave. All questions addressed.  Previous records reviewed.

## 2025-06-14 RX ORDER — LEVOTHYROXINE SODIUM 50 UG/1
TABLET ORAL
Qty: 24 TABLET | Refills: 0 | Status: SHIPPED | OUTPATIENT
Start: 2025-06-14

## 2025-07-24 ENCOUNTER — OFFICE VISIT (OUTPATIENT)
Age: 45
End: 2025-07-24
Payer: COMMERCIAL

## 2025-07-24 VITALS
RESPIRATION RATE: 16 BRPM | BODY MASS INDEX: 32.36 KG/M2 | HEART RATE: 90 BPM | DIASTOLIC BLOOD PRESSURE: 76 MMHG | WEIGHT: 171.4 LBS | OXYGEN SATURATION: 99 % | SYSTOLIC BLOOD PRESSURE: 110 MMHG | HEIGHT: 61 IN | TEMPERATURE: 97.5 F

## 2025-07-24 DIAGNOSIS — R73.03 PREDIABETES: ICD-10-CM

## 2025-07-24 DIAGNOSIS — E55.9 VITAMIN D DEFICIENCY: ICD-10-CM

## 2025-07-24 DIAGNOSIS — M70.52 PATELLAR BURSITIS OF BOTH KNEES: Primary | ICD-10-CM

## 2025-07-24 DIAGNOSIS — D50.9 MICROCYTIC HYPOCHROMIC ANEMIA: ICD-10-CM

## 2025-07-24 DIAGNOSIS — E53.8 VITAMIN B12 DEFICIENCY: ICD-10-CM

## 2025-07-24 DIAGNOSIS — D56.3 THALASSEMIA MINOR: ICD-10-CM

## 2025-07-24 DIAGNOSIS — M77.12 LEFT TENNIS ELBOW: ICD-10-CM

## 2025-07-24 DIAGNOSIS — M77.11 RIGHT TENNIS ELBOW: ICD-10-CM

## 2025-07-24 DIAGNOSIS — M70.51 PATELLAR BURSITIS OF BOTH KNEES: Primary | ICD-10-CM

## 2025-07-24 DIAGNOSIS — E03.9 HYPOTHYROIDISM IN ADULT: ICD-10-CM

## 2025-07-24 DIAGNOSIS — M72.2 PLANTAR FASCIITIS OF RIGHT FOOT: ICD-10-CM

## 2025-07-24 PROBLEM — K22.70 BARRETT'S ESOPHAGUS WITHOUT DYSPLASIA: Status: ACTIVE | Noted: 2025-05-01

## 2025-07-24 PROCEDURE — 99214 OFFICE O/P EST MOD 30 MIN: CPT | Performed by: FAMILY MEDICINE

## 2025-07-24 ASSESSMENT — PATIENT HEALTH QUESTIONNAIRE - PHQ9
SUM OF ALL RESPONSES TO PHQ QUESTIONS 1-9: 0
6. FEELING BAD ABOUT YOURSELF - OR THAT YOU ARE A FAILURE OR HAVE LET YOURSELF OR YOUR FAMILY DOWN: NOT AT ALL
SUM OF ALL RESPONSES TO PHQ QUESTIONS 1-9: 0
4. FEELING TIRED OR HAVING LITTLE ENERGY: NOT AT ALL
2. FEELING DOWN, DEPRESSED OR HOPELESS: NOT AT ALL
9. THOUGHTS THAT YOU WOULD BE BETTER OFF DEAD, OR OF HURTING YOURSELF: NOT AT ALL
7. TROUBLE CONCENTRATING ON THINGS, SUCH AS READING THE NEWSPAPER OR WATCHING TELEVISION: NOT AT ALL
SUM OF ALL RESPONSES TO PHQ QUESTIONS 1-9: 0
10. IF YOU CHECKED OFF ANY PROBLEMS, HOW DIFFICULT HAVE THESE PROBLEMS MADE IT FOR YOU TO DO YOUR WORK, TAKE CARE OF THINGS AT HOME, OR GET ALONG WITH OTHER PEOPLE: NOT DIFFICULT AT ALL
SUM OF ALL RESPONSES TO PHQ QUESTIONS 1-9: 0
3. TROUBLE FALLING OR STAYING ASLEEP: NOT AT ALL
1. LITTLE INTEREST OR PLEASURE IN DOING THINGS: NOT AT ALL
8. MOVING OR SPEAKING SO SLOWLY THAT OTHER PEOPLE COULD HAVE NOTICED. OR THE OPPOSITE, BEING SO FIGETY OR RESTLESS THAT YOU HAVE BEEN MOVING AROUND A LOT MORE THAN USUAL: NOT AT ALL
5. POOR APPETITE OR OVEREATING: NOT AT ALL

## 2025-07-24 ASSESSMENT — ENCOUNTER SYMPTOMS
CONSTIPATION: 0
ABDOMINAL PAIN: 0
CHEST TIGHTNESS: 0
BACK PAIN: 0
COUGH: 0
SORE THROAT: 0
DIARRHEA: 0
SHORTNESS OF BREATH: 0

## 2025-07-24 NOTE — PROGRESS NOTES
Chief Complaint   Patient presents with    Thyroid Problem     Follow up         \"Have you been to the ER, urgent care clinic since your last visit?  Hospitalized since your last visit?\"    YES - When: approximately April ER  ago.  Where and Why: ER abdominal apin.    “Have you seen or consulted any other health care providers outside of [facility] since your last visit?”    NO            Click Here for Release of Records Request           4/9/2025    11:49 AM   PHQ-9    Little interest or pleasure in doing things 0   Feeling down, depressed, or hopeless 0   Trouble falling or staying asleep, or sleeping too much 0   Feeling tired or having little energy 0   Poor appetite or overeating 0   Feeling bad about yourself - or that you are a failure or have let yourself or your family down 0   Trouble concentrating on things, such as reading the newspaper or watching television 0   Moving or speaking so slowly that other people could have noticed. Or the opposite - being so fidgety or restless that you have been moving around a lot more than usual 0   Thoughts that you would be better off dead, or of hurting yourself in some way 0   PHQ-2 Score 0   PHQ-9 Total Score 0   If you checked off any problems, how difficult have these problems made it for you to do your work, take care of things at home, or get along with other people? 0           Financial Resource Strain: Low Risk  (7/29/2024)    Overall Financial Resource Strain (CARDIA)     Difficulty of Paying Living Expenses: Not hard at all      Food Insecurity: No Food Insecurity (3/19/2025)    Hunger Vital Sign     Worried About Running Out of Food in the Last Year: Never true     Ran Out of Food in the Last Year: Never true          Health Maintenance Due   Topic Date Due    Hepatitis B vaccine (1 of 3 - 19+ 3-dose series) Never done    COVID-19 Vaccine (3 - 2024-25 season) 09/01/2024    A1C test (Diabetic or Prediabetic)  06/19/2025        
  Respiratory:  Negative for cough, chest tightness and shortness of breath.    Cardiovascular:  Negative for chest pain and palpitations.   Gastrointestinal:  Negative for abdominal pain, constipation and diarrhea.   Genitourinary:  Negative for dysuria.   Musculoskeletal:  Negative for back pain and neck pain.        Right heel pain, bilateral elbow pain and left knee pain   Skin:  Negative for rash.   Neurological:  Negative for dizziness and headaches.   Psychiatric/Behavioral:  Negative for behavioral problems. The patient is not nervous/anxious.        Medications     Current Outpatient Medications   Medication Sig Dispense Refill    diclofenac sodium (VOLTAREN) 1 % GEL Apply 4 g topically 4 times daily 100 g 1    levothyroxine (SYNTHROID) 50 MCG tablet TAKE 1 TABLET BY MOUTH TWICE A WEEK, ONCE ON SATURDAY AND ONCE ON SUNDAY 24 tablet 0    Insulin Syringe-Needle U-100 31G X 15/64\" 1 ML MISC Use as directed to inject medication weekly 10 each 0    levonorgestrel (MIRENA) IUD 52 mg 1 each by IntraUTERine route once      EUTHYROX 75 MCG tablet TAKE 1 TABLET BY MOUTH ONCE DAILY IN THE MORNING ON AN EMPTY STOMACH MONDAYS  THROUGH  FRIDAYS 90 tablet 0    IUD'S IU by IntraUTERine route      acetaminophen (TYLENOL) 500 MG tablet Take 2 tablets by mouth every 6 hours as needed for Pain (Patient not taking: Reported on 7/24/2025) 80 tablet 0    omeprazole (PRILOSEC) 40 MG delayed release capsule Take 1 capsule by mouth every morning (before breakfast) (Patient not taking: Reported on 7/24/2025) 30 capsule 0    dicyclomine (BENTYL) 20 MG tablet Take 1 tablet by mouth 4 times daily (Patient not taking: Reported on 7/24/2025) 120 tablet 0    diclofenac (VOLTAREN) 75 MG EC tablet Take 1 tablet by mouth 2 times daily (Patient not taking: Reported on 7/24/2025) 60 tablet 3    cyanocobalamin 1000 MCG/ML injection Inject 1 mL into the muscle every 7 days (Patient not taking: Reported on 7/24/2025) 4 mL 0

## 2025-07-25 LAB
25(OH)D3 SERPL-MCNC: 36.1 NG/ML (ref 30–100)
BASOPHILS # BLD: 0.05 K/UL (ref 0–0.1)
BASOPHILS NFR BLD: 0.7 % (ref 0–1)
DIFFERENTIAL METHOD BLD: ABNORMAL
EOSINOPHIL # BLD: 0.41 K/UL (ref 0–0.4)
EOSINOPHIL NFR BLD: 5.7 % (ref 0–7)
ERYTHROCYTE [DISTWIDTH] IN BLOOD BY AUTOMATED COUNT: 15.9 % (ref 11.5–14.5)
EST. AVERAGE GLUCOSE BLD GHB EST-MCNC: 134 MG/DL
HBA1C MFR BLD: 6.3 % (ref 4–5.6)
HCT VFR BLD AUTO: 35.4 % (ref 35–47)
HGB BLD-MCNC: 10.3 G/DL (ref 11.5–16)
IMM GRANULOCYTES # BLD AUTO: 0.02 K/UL (ref 0–0.04)
IMM GRANULOCYTES NFR BLD AUTO: 0.3 % (ref 0–0.5)
LYMPHOCYTES # BLD: 2.63 K/UL (ref 0.8–3.5)
LYMPHOCYTES NFR BLD: 36.5 % (ref 12–49)
MCH RBC QN AUTO: 19.8 PG (ref 26–34)
MCHC RBC AUTO-ENTMCNC: 29.1 G/DL (ref 30–36.5)
MCV RBC AUTO: 68.2 FL (ref 80–99)
MONOCYTES # BLD: 0.46 K/UL (ref 0–1)
MONOCYTES NFR BLD: 6.4 % (ref 5–13)
NEUTS SEG # BLD: 3.63 K/UL (ref 1.8–8)
NEUTS SEG NFR BLD: 50.4 % (ref 32–75)
NRBC # BLD: 0 K/UL (ref 0–0.01)
NRBC BLD-RTO: 0 PER 100 WBC
PLATELET # BLD AUTO: 400 K/UL (ref 150–400)
PMV BLD AUTO: 11.2 FL (ref 8.9–12.9)
RBC # BLD AUTO: 5.19 M/UL (ref 3.8–5.2)
RBC MORPH BLD: ABNORMAL
T4 FREE SERPL-MCNC: 1.6 NG/DL (ref 0.8–1.5)
TSH SERPL DL<=0.05 MIU/L-ACNC: 2.67 UIU/ML (ref 0.36–3.74)
VIT B12 SERPL-MCNC: 516 PG/ML (ref 193–986)
WBC # BLD AUTO: 7.2 K/UL (ref 3.6–11)

## 2025-08-11 RX ORDER — LEVOTHYROXINE SODIUM 75 UG/1
TABLET ORAL
Qty: 90 TABLET | Refills: 0 | Status: SHIPPED | OUTPATIENT
Start: 2025-08-11 | End: 2025-08-12 | Stop reason: SDUPTHER

## 2025-08-11 RX ORDER — LEVOTHYROXINE SODIUM 50 UG/1
TABLET ORAL
Qty: 30 TABLET | Refills: 0 | Status: SHIPPED | OUTPATIENT
Start: 2025-08-11 | End: 2025-08-12 | Stop reason: SDUPTHER

## 2025-08-12 DIAGNOSIS — E03.9 HYPOTHYROIDISM IN ADULT: Primary | ICD-10-CM

## 2025-08-12 RX ORDER — LEVOTHYROXINE SODIUM 50 UG/1
TABLET ORAL
Qty: 30 TABLET | Refills: 0 | Status: SHIPPED | OUTPATIENT
Start: 2025-08-12

## 2025-08-12 RX ORDER — LEVOTHYROXINE SODIUM 75 UG/1
TABLET ORAL
Qty: 90 TABLET | Refills: 0 | Status: SHIPPED | OUTPATIENT
Start: 2025-08-12

## 2025-08-20 DIAGNOSIS — E03.9 HYPOTHYROIDISM IN ADULT: ICD-10-CM

## 2025-08-20 RX ORDER — LEVOTHYROXINE SODIUM 50 MCG
TABLET ORAL
Qty: 30 TABLET | Refills: 0 | Status: SHIPPED | OUTPATIENT
Start: 2025-08-20

## 2025-08-20 RX ORDER — LEVOTHYROXINE SODIUM 75 MCG
TABLET ORAL
Qty: 90 TABLET | Refills: 0 | Status: SHIPPED | OUTPATIENT
Start: 2025-08-20

## 2025-08-26 DIAGNOSIS — E03.9 HYPOTHYROIDISM IN ADULT: Primary | ICD-10-CM

## 2025-08-26 RX ORDER — LEVOTHYROXINE SODIUM 75 UG/1
75 TABLET ORAL DAILY
Qty: 90 TABLET | Refills: 0 | Status: SHIPPED | OUTPATIENT
Start: 2025-08-26

## 2025-08-26 RX ORDER — LEVOTHYROXINE SODIUM 75 MCG
TABLET ORAL
Qty: 90 TABLET | Refills: 0 | Status: CANCELLED | OUTPATIENT
Start: 2025-08-26